# Patient Record
Sex: FEMALE | Race: WHITE | NOT HISPANIC OR LATINO | Employment: OTHER | URBAN - METROPOLITAN AREA
[De-identification: names, ages, dates, MRNs, and addresses within clinical notes are randomized per-mention and may not be internally consistent; named-entity substitution may affect disease eponyms.]

---

## 2019-05-02 ENCOUNTER — OFFICE VISIT (OUTPATIENT)
Dept: FAMILY MEDICINE CLINIC | Facility: CLINIC | Age: 84
End: 2019-05-02

## 2019-05-02 VITALS
BODY MASS INDEX: 20.73 KG/M2 | HEART RATE: 76 BPM | DIASTOLIC BLOOD PRESSURE: 76 MMHG | HEIGHT: 66 IN | WEIGHT: 129 LBS | OXYGEN SATURATION: 96 % | SYSTOLIC BLOOD PRESSURE: 110 MMHG | TEMPERATURE: 98.2 F

## 2019-05-02 DIAGNOSIS — D72.820 LYMPHOCYTOSIS: ICD-10-CM

## 2019-05-02 DIAGNOSIS — E03.9 ACQUIRED HYPOTHYROIDISM: ICD-10-CM

## 2019-05-02 DIAGNOSIS — E11.65 TYPE 2 DIABETES MELLITUS WITH HYPERGLYCEMIA, WITHOUT LONG-TERM CURRENT USE OF INSULIN (HCC): ICD-10-CM

## 2019-05-02 DIAGNOSIS — I10 BENIGN ESSENTIAL HYPERTENSION: Primary | ICD-10-CM

## 2019-05-02 PROBLEM — L40.9 PSORIASIS: Status: ACTIVE | Noted: 2018-03-19

## 2019-05-02 PROCEDURE — 99203 OFFICE O/P NEW LOW 30 MIN: CPT | Performed by: INTERNAL MEDICINE

## 2019-05-02 RX ORDER — LEVOTHYROXINE SODIUM 0.05 MG/1
50 TABLET ORAL DAILY
COMMUNITY
Start: 2019-03-27 | End: 2019-09-09 | Stop reason: SDUPTHER

## 2019-05-02 RX ORDER — IRBESARTAN 150 MG/1
15 TABLET ORAL DAILY
COMMUNITY
Start: 2018-09-17 | End: 2019-09-09 | Stop reason: SDUPTHER

## 2019-05-02 NOTE — PROGRESS NOTES
Subjective Marla is here today to establish care.  Marla Muller is a 86 y.o. female.     History of Present Illness   Marla is here today to establish care for her diabetes cholesterol and thyroid.  She also has some high blood pressure.  She is on Erbe Joce.  She does not have a recall product.  I did review laboratory from her previous physician.  Her TSH was slightly elevated at 4.99.  She has been on the same dose of levothyroxine at 50 mcg for years.  She also has had a steadily escalating white blood cell count with a lymphocyte predominance.  She has not been diagnosed with CLL.  Her  has this.  Also has some non-insulin-dependent diabetes which she controls with diet.  Her last blood sugar was 145.  I cannot find a hemoglobin A1c and her lab work.  The following portions of the patient's history were reviewed and updated as appropriate: allergies, current medications, past family history, past medical history, past social history, past surgical history and problem list.    Review of Systems   Constitutional: Negative for chills, diaphoresis and fever.   HENT: Negative for nosebleeds.    Respiratory: Negative for chest tightness and shortness of breath.    Cardiovascular: Negative for chest pain and leg swelling.   Gastrointestinal: Negative for blood in stool.   Skin: Negative for bruise.   Hematological: Does not bruise/bleed easily.       Objective   Physical Exam   Constitutional: She appears well-developed and well-nourished.   Neck: Carotid bruit is not present. No thyromegaly present.   Cardiovascular: Normal rate, regular rhythm, normal heart sounds and intact distal pulses. Exam reveals no friction rub.   No murmur heard.  Pulmonary/Chest: Effort normal and breath sounds normal. No respiratory distress. She has no wheezes.   Musculoskeletal: She exhibits no edema.         Assessment/Plan   Marla was seen today for establish care, diabetes, hyperlipidemia and hypothyroidism.    Diagnoses and  all orders for this visit:    Benign essential hypertension    Acquired hypothyroidism    Type 2 diabetes mellitus with hyperglycemia, without long-term current use of insulin (CMS/[facility], located [facility])    Lymphocytosis  -     Ambulatory Referral to Hematology / Oncology      Marla is here today to establish care.  I believe she has CLL.  I am going to refer her to an oncologist though I do not think this will need any treatment at the present time.  I am going to recheck her thyroid when she comes back in 3 months.  For now we are not going to change her dose.

## 2019-08-02 ENCOUNTER — OFFICE VISIT (OUTPATIENT)
Dept: FAMILY MEDICINE CLINIC | Facility: CLINIC | Age: 84
End: 2019-08-02

## 2019-08-02 VITALS
WEIGHT: 131.8 LBS | HEIGHT: 66 IN | HEART RATE: 67 BPM | OXYGEN SATURATION: 97 % | DIASTOLIC BLOOD PRESSURE: 80 MMHG | SYSTOLIC BLOOD PRESSURE: 120 MMHG | TEMPERATURE: 98.1 F | BODY MASS INDEX: 21.18 KG/M2

## 2019-08-02 DIAGNOSIS — I10 BENIGN ESSENTIAL HYPERTENSION: ICD-10-CM

## 2019-08-02 DIAGNOSIS — E11.65 TYPE 2 DIABETES MELLITUS WITH HYPERGLYCEMIA, WITHOUT LONG-TERM CURRENT USE OF INSULIN (HCC): Primary | ICD-10-CM

## 2019-08-02 DIAGNOSIS — E03.9 ACQUIRED HYPOTHYROIDISM: ICD-10-CM

## 2019-08-02 PROCEDURE — 99213 OFFICE O/P EST LOW 20 MIN: CPT | Performed by: INTERNAL MEDICINE

## 2019-08-02 NOTE — PROGRESS NOTES
Subjective Chief complaints follow-up for blood pressure but also checkup on diabetes  Marla Muller is a 86 y.o. female.     History of Present Illness   Marla is here today for follow-up.  She seems to be doing well.  She is a little bit fatigued from caring for her .  Does have hypertension fairly well controlled with irbesartan.  She is on very low-dose levothyroxine and is in need of thyroid testing.  Her last hemoglobin A1c for her diet-controlled diabetes was 7.  As needed prescription on new test strips.  Her last visit we did refer her to hematologist for her leukocytosis with lymphocytic predominance.  She still has not been able to see the specialist.  She has an appointment on Monday.  The following portions of the patient's history were reviewed and updated as appropriate: allergies, current medications, past family history, past medical history, past social history, past surgical history and problem list.    Review of Systems   Respiratory: Negative for chest tightness and shortness of breath.    Cardiovascular: Negative for chest pain.       Objective   Physical Exam   Constitutional: She appears well-developed and well-nourished.   Cardiovascular: Normal rate, regular rhythm and normal heart sounds.   Pulmonary/Chest: Effort normal and breath sounds normal.   Musculoskeletal: She exhibits no edema.   Nursing note and vitals reviewed.        Assessment/Plan   Marla was seen today for hypertension.    Diagnoses and all orders for this visit:    Type 2 diabetes mellitus with hyperglycemia, without long-term current use of insulin (CMS/[facility])  -     Basic Metabolic Panel  -     Hemoglobin A1c    Benign essential hypertension    Acquired hypothyroidism  -     TSH+Free T4  -     T3, Free    Other orders  -     glucose blood test strip; 1 each by Other route Daily. Use as instructed      Marla is here today for follow-up.  Her blood pressure seems to be doing well.  We are going to check on status of  her diabetes and thyroid.  If all is well with her we can see her back in 6 months.

## 2019-08-03 LAB
BUN SERPL-MCNC: 15 MG/DL (ref 8–23)
BUN/CREAT SERPL: 22.4 (ref 7–25)
CALCIUM SERPL-MCNC: 9.9 MG/DL (ref 8.6–10.5)
CHLORIDE SERPL-SCNC: 97 MMOL/L (ref 98–107)
CO2 SERPL-SCNC: 27.9 MMOL/L (ref 22–29)
CREAT SERPL-MCNC: 0.67 MG/DL (ref 0.57–1)
GLUCOSE SERPL-MCNC: 147 MG/DL (ref 65–99)
HBA1C MFR BLD: 6.8 % (ref 4.8–5.6)
POTASSIUM SERPL-SCNC: 4 MMOL/L (ref 3.5–5.2)
SODIUM SERPL-SCNC: 137 MMOL/L (ref 136–145)
T3FREE SERPL-MCNC: 2.7 PG/ML (ref 2–4.4)
T4 FREE SERPL-MCNC: 1.24 NG/DL (ref 0.93–1.7)
TSH SERPL DL<=0.005 MIU/L-ACNC: 3.98 MIU/ML (ref 0.27–4.2)

## 2019-08-05 ENCOUNTER — CONSULT (OUTPATIENT)
Dept: ONCOLOGY | Facility: CLINIC | Age: 84
End: 2019-08-05

## 2019-08-05 ENCOUNTER — LAB (OUTPATIENT)
Dept: LAB | Facility: HOSPITAL | Age: 84
End: 2019-08-05

## 2019-08-05 VITALS
WEIGHT: 131.4 LBS | SYSTOLIC BLOOD PRESSURE: 134 MMHG | RESPIRATION RATE: 16 BRPM | TEMPERATURE: 98.5 F | DIASTOLIC BLOOD PRESSURE: 80 MMHG | HEART RATE: 78 BPM | HEIGHT: 64 IN | OXYGEN SATURATION: 98 % | BODY MASS INDEX: 22.43 KG/M2

## 2019-08-05 DIAGNOSIS — D72.820 LYMPHOCYTOSIS: Primary | ICD-10-CM

## 2019-08-05 DIAGNOSIS — R59.1 LYMPHADENOPATHY: ICD-10-CM

## 2019-08-05 LAB
B2 MICROGLOB SERPL-MCNC: 1.7 MG/L (ref 0.8–2.2)
BASOPHILS # BLD AUTO: 0.07 10*3/MM3 (ref 0–0.2)
BASOPHILS NFR BLD AUTO: 0.4 % (ref 0–1.5)
DEPRECATED RDW RBC AUTO: 40.6 FL (ref 37–54)
EOSINOPHIL # BLD AUTO: 0.17 10*3/MM3 (ref 0–0.4)
EOSINOPHIL NFR BLD AUTO: 1.1 % (ref 0.3–6.2)
ERYTHROCYTE [DISTWIDTH] IN BLOOD BY AUTOMATED COUNT: 12.8 % (ref 12.3–15.4)
HCT VFR BLD AUTO: 37.8 % (ref 34–46.6)
HGB BLD-MCNC: 12.8 G/DL (ref 12–15.9)
IGA1 MFR SER: 120 MG/DL (ref 70–400)
IGG1 SER-MCNC: 704 MG/DL (ref 700–1600)
IGM SERPL-MCNC: 43 MG/DL (ref 40–230)
IMM GRANULOCYTES # BLD AUTO: 0.07 10*3/MM3 (ref 0–0.05)
IMM GRANULOCYTES NFR BLD AUTO: 0.4 % (ref 0–0.5)
LDH SERPL-CCNC: 214 U/L (ref 99–259)
LYMPHOCYTES # BLD AUTO: 8.62 10*3/MM3 (ref 0.7–3.1)
LYMPHOCYTES NFR BLD AUTO: 54.2 % (ref 19.6–45.3)
MCH RBC QN AUTO: 29.5 PG (ref 26.6–33)
MCHC RBC AUTO-ENTMCNC: 33.9 G/DL (ref 31.5–35.7)
MCV RBC AUTO: 87.1 FL (ref 79–97)
MONOCYTES # BLD AUTO: 1.03 10*3/MM3 (ref 0.1–0.9)
MONOCYTES NFR BLD AUTO: 6.5 % (ref 5–12)
NEUTROPHILS # BLD AUTO: 5.95 10*3/MM3 (ref 1.7–7)
NEUTROPHILS NFR BLD AUTO: 37.4 % (ref 42.7–76)
NRBC BLD AUTO-RTO: 0 /100 WBC (ref 0–0.2)
PLATELET # BLD AUTO: 281 10*3/MM3 (ref 140–450)
PMV BLD AUTO: 9.9 FL (ref 6–12)
RBC # BLD AUTO: 4.34 10*6/MM3 (ref 3.77–5.28)
WBC NRBC COR # BLD: 15.91 10*3/MM3 (ref 3.4–10.8)

## 2019-08-05 PROCEDURE — 36415 COLL VENOUS BLD VENIPUNCTURE: CPT

## 2019-08-05 PROCEDURE — 82232 ASSAY OF BETA-2 PROTEIN: CPT | Performed by: INTERNAL MEDICINE

## 2019-08-05 PROCEDURE — 85025 COMPLETE CBC W/AUTO DIFF WBC: CPT

## 2019-08-05 PROCEDURE — 99205 OFFICE O/P NEW HI 60 MIN: CPT | Performed by: INTERNAL MEDICINE

## 2019-08-05 PROCEDURE — 83615 LACTATE (LD) (LDH) ENZYME: CPT | Performed by: INTERNAL MEDICINE

## 2019-08-05 PROCEDURE — 82784 ASSAY IGA/IGD/IGG/IGM EACH: CPT | Performed by: INTERNAL MEDICINE

## 2019-08-05 NOTE — PROGRESS NOTES
Subjective     REASON FOR CONSULTATION:  Provide an opinion on any further workup or treatment on:    Lymphocytosis                       REQUESTING PHYSICIAN: Candelario Lindsey*    RECORDS OBTAINED: Records of the patients history including those obtained from the referring provider were reviewed and summarized in detail.    HISTORY OF PRESENT ILLNESS:    Marla Muller is a 86 y.o. patient who was referred for evaluation of leukocytosis and lymphocytosis.  She was found to have a WBC of 22,100 on 3/7/2019.  Her CBC was repeated on 3/12/2019 and was down to 19,100. She did not have an infection at that time and did not start any new medicine at that time. She was however under stress related to having to relocate to [place].      The patient did not feel any lymph nodes. She is not having abdominal pain.          REVIEW OF SYSTEMS:  Review of Systems   Constitutional: Negative for chills, fever and unexpected weight change.   HENT: Negative for mouth sores, nosebleeds, sore throat and voice change.    Eyes: Negative for visual disturbance.   Respiratory: Negative for cough and shortness of breath.    Cardiovascular: Negative for chest pain and leg swelling.   Gastrointestinal: Negative for abdominal pain, blood in stool, constipation, diarrhea, nausea and vomiting.   Genitourinary: Negative for dysuria, frequency and hematuria.   Musculoskeletal: Positive for joint swelling. Negative for arthralgias and back pain.   Skin: Negative for rash.   Neurological: Negative for dizziness, numbness and headaches.   Hematological: Negative for adenopathy. Bruises/bleeds easily.   Psychiatric/Behavioral: Negative for dysphoric mood. The patient is not nervous/anxious.        Past Medical History:   Diagnosis Date   • Anxiety    • Diabetes mellitus (CMS/HCC)     Type 2   • GERD (gastroesophageal reflux disease)    • Hyperlipidemia    • Hypertension    • Hypothyroidism    • Psoriasis        Past Surgical History:  "  Procedure Laterality Date   • ANTERIOR AND POSTERIOR VAGINAL REPAIR     • APPENDECTOMY     • CHOLECYSTECTOMY     • CYSTOSCOPY  2016    Tension free vaginal taping (TVT)   • HYSTERECTOMY     • OTHER SURGICAL HISTORY  2016    Tension-free vaginal taping   • TONSILLECTOMY         Social History     Socioeconomic History   • Marital status:      Spouse name: Not on file   • Number of children: Not on file   • Years of education: Not on file   • Highest education level: Not on file   Occupational History     Employer: RETIRED   Tobacco Use   • Smoking status: Never Smoker   • Smokeless tobacco: Never Used   Substance and Sexual Activity   • Alcohol use: No     Frequency: Never   • Drug use: No   • Sexual activity: Defer       Cancer-related family history includes Cancer (age of onset: 61) in her brother.    MEDICATIONS:    Current Outpatient Medications:   •  Calcium-Vitamin D 500-125 MG-UNIT tablet, Take 1 tablet by mouth Daily., Disp: , Rfl:   •  irbesartan (AVAPRO) 150 MG tablet, Take 15 mg by mouth Daily., Disp: , Rfl:   •  levothyroxine (SYNTHROID) 50 MCG tablet, Take 50 mcg by mouth Daily., Disp: , Rfl:   •  glucose blood test strip, 1 each by Other route Daily. Use as instructed, Disp: 100 each, Rfl: 3     ALLERGIES:  No Known Allergies     Objective   VITAL SIGNS:  Vitals:    08/05/19 1449   BP: 134/80   Pulse: 78   Resp: 16   Temp: 98.5 °F (36.9 °C)   TempSrc: Oral   SpO2: 98%   Weight: 59.6 kg (131 lb 6.4 oz)   Height: 163 cm (64.17\")  Comment: new ht   PainSc: 0-No pain       Wt Readings from Last 3 Encounters:   08/05/19 59.6 kg (131 lb 6.4 oz)   08/02/19 59.8 kg (131 lb 12.8 oz)   05/02/19 58.5 kg (129 lb)       PHYSICAL EXAMINATION  GENERAL:  The patient appears in fair general condition, not in acute distress.  SKIN: Warm and dry. No skin rashes, ecchymosis or petechiae.  HEAD:  Normocephalic.  EYES:  No Jaundice. No Pallor. Pupils equal. EOMI.  MOUTH: No Ulcers. No Thrush. No Exudates.  NECK:  " Supple with Good ROM. No Thyromegaly. No Masses.  LYMPHATICS:  Right mid cervical LN measuring 8 mm. Right axillary lymph node measuring 1.5 cm. No left axillary or inguinal lymphadenopathy.   CHEST: Normal respiratory effort. Lungs clear to auscultation.   CARDIAC:  Normal S1 & S2. No murmurs. No edema.  ABDOMEN:  Soft. No tenderness. No Hepatomegaly. No Splenomegaly. No masses.  EXTREMITIES:  No clubbing. No joint swelling in the hands. No Calf tenderness.  NEUROLOGICAL:  No Focal neurological deficits.         RESULT REVIEW:   Results from last 7 days   Lab Units 08/05/19  1423   WBC 10*3/mm3 15.91*   NEUTROS ABS 10*3/mm3 5.95   HEMOGLOBIN g/dL 12.8   HEMATOCRIT % 37.8   PLATELETS 10*3/mm3 281     Results from last 7 days   Lab Units 08/02/19  1514   SODIUM mmol/L 137   POTASSIUM mmol/L 4.0   CHLORIDE mmol/L 97*   TOTAL CO2 mmol/L 27.9   BUN mg/dL 15   CREATININE mg/dL 0.67   CALCIUM mg/dL 9.9     I reviewed the peripheral blood smear from today. There is an increase in the lymphocytes. Smudge cells identified. No premature cells or blasts were identified.  RBCs have normal morphology. Platelets are normal.       Assessment/Plan   Lymphocytosis. This was identified in March 2019. The patient continues to have lymphocytosis on her CBC from today but the total count has decreased to 8,600.  Review of the peripheral smear showed normal sized lymphocytes. There are smudge cells identified.  She has enlarged lymph nodes in the right cervical and axillary areas. The findings are concerning for a lymphoproliferative disorder like chronic lymphocytic leukemia (CLL).  I explained the findings were explained to the patient and her son. Her  has CLL that was diagnosed in 2017.    PLAN:    1.  Obtain a peripheral blood flow cytometry.  2.  Obtain LDH, B2M and Immunoglobulin levels.   3.  We will contact the patient with the results.    4.  We will see her in follow up in 8 weeks with a repeat CBC.         Indira CANDELARIA  MD Sandy  08/05/19

## 2019-08-09 LAB — REF LAB TEST METHOD: NORMAL

## 2019-09-09 RX ORDER — LEVOTHYROXINE SODIUM 50 MCG
50 TABLET ORAL DAILY
Qty: 90 TABLET | Refills: 3 | Status: SHIPPED | OUTPATIENT
Start: 2019-09-09 | End: 2020-05-08 | Stop reason: SDUPTHER

## 2019-09-09 RX ORDER — IRBESARTAN 150 MG/1
150 TABLET ORAL DAILY
Qty: 90 TABLET | Refills: 1 | Status: SHIPPED | OUTPATIENT
Start: 2019-09-09 | End: 2020-02-26

## 2019-09-25 ENCOUNTER — APPOINTMENT (OUTPATIENT)
Dept: OTHER | Facility: HOSPITAL | Age: 84
End: 2019-09-25

## 2019-09-25 ENCOUNTER — APPOINTMENT (OUTPATIENT)
Dept: ONCOLOGY | Facility: CLINIC | Age: 84
End: 2019-09-25

## 2019-10-23 ENCOUNTER — APPOINTMENT (OUTPATIENT)
Dept: OTHER | Facility: HOSPITAL | Age: 84
End: 2019-10-23

## 2019-10-23 ENCOUNTER — APPOINTMENT (OUTPATIENT)
Dept: ONCOLOGY | Facility: CLINIC | Age: 84
End: 2019-10-23

## 2020-02-26 ENCOUNTER — TELEPHONE (OUTPATIENT)
Dept: ONCOLOGY | Facility: CLINIC | Age: 85
End: 2020-02-26

## 2020-02-26 RX ORDER — IRBESARTAN 150 MG/1
TABLET ORAL
Qty: 90 TABLET | Refills: 1 | Status: SHIPPED | OUTPATIENT
Start: 2020-02-26 | End: 2020-05-08 | Stop reason: SDUPTHER

## 2020-02-26 NOTE — TELEPHONE ENCOUNTER
----- Message from Indira Delgado MD sent at 2/25/2020  6:36 PM EST -----  Patient had an appointment but she canceled.  Please contact her to schedule an appointment in ~ 1 month + CBC    Thank you

## 2020-04-21 ENCOUNTER — TELEPHONE (OUTPATIENT)
Dept: ONCOLOGY | Facility: CLINIC | Age: 85
End: 2020-04-21

## 2020-04-21 NOTE — TELEPHONE ENCOUNTER
Pt called stating she received reminder in the mail for appt on 3/25/20 for , Wellington.    Pt stating Wellington passed away. Went to Wellington's chart and was Wellington marked . Informed pt all appts have been canceled.    After looking at past appts, [facility] was unable to see appt for Wellington on 3/25/20.    Asked pt her name and date of birth and the appt was for her on 3/25/20.     [facility] disconnected with pt and unable to reach pt when calling back.    Please contact pt at [phone] or  [phone] to reschedule 3/25/20 no show appt.

## 2020-05-08 ENCOUNTER — OFFICE VISIT (OUTPATIENT)
Dept: FAMILY MEDICINE CLINIC | Facility: CLINIC | Age: 85
End: 2020-05-08

## 2020-05-08 VITALS — BODY MASS INDEX: 22.43 KG/M2 | TEMPERATURE: 98.5 F | WEIGHT: 131.39 LBS | HEIGHT: 64 IN

## 2020-05-08 DIAGNOSIS — R30.0 DYSURIA: ICD-10-CM

## 2020-05-08 DIAGNOSIS — F41.9 ANXIETY: ICD-10-CM

## 2020-05-08 DIAGNOSIS — E11.65 TYPE 2 DIABETES MELLITUS WITH HYPERGLYCEMIA, WITHOUT LONG-TERM CURRENT USE OF INSULIN (HCC): Primary | ICD-10-CM

## 2020-05-08 PROCEDURE — 99442 PR PHYS/QHP TELEPHONE EVALUATION 11-20 MIN: CPT | Performed by: INTERNAL MEDICINE

## 2020-05-08 RX ORDER — CEFUROXIME AXETIL 250 MG/1
250 TABLET ORAL 2 TIMES DAILY
Qty: 14 TABLET | Refills: 0 | Status: SHIPPED | OUTPATIENT
Start: 2020-05-08 | End: 2020-08-28

## 2020-05-08 RX ORDER — ESCITALOPRAM OXALATE 5 MG/1
5 TABLET ORAL DAILY
Qty: 30 TABLET | Refills: 2 | Status: SHIPPED | OUTPATIENT
Start: 2020-05-08 | End: 2020-08-28

## 2020-05-08 RX ORDER — IRBESARTAN 150 MG/1
150 TABLET ORAL DAILY
Qty: 90 TABLET | Refills: 1 | Status: SHIPPED | OUTPATIENT
Start: 2020-05-08 | End: 2020-12-09

## 2020-05-08 RX ORDER — LEVOTHYROXINE SODIUM 50 MCG
50 TABLET ORAL DAILY
Qty: 90 TABLET | Refills: 3 | Status: SHIPPED | OUTPATIENT
Start: 2020-05-08 | End: 2021-05-06

## 2020-05-08 NOTE — PROGRESS NOTES
Subjective Complaint is possible urinary tract infection  Marla Muller is a 87 y.o. female. This visit has been rescheduled as a phone visit to comply with patient safety concerns in accordance with CDC recommendations. Total time of discussion was 12 minutes.  12 minutes were spent on the phone with the patient.  3 minutes were spent completing the note.        History of Present Illness Marla is complaining of some moderate burning and discomfort with urination.  It feels like a urinary tract infection she had years ago.  She has been trying some Azo and cranberry juice which has helped somewhat.  She is also experiencing some increased frequency with urination.  She does have diabetes.  She is not on any medication.  She has not been checking her blood sugars since she ran out of test strips.  She seems to be experiencing some prolonged grief and anxiety related to her 's passing away.  He passed away approximately 6 months ago today.  Past history is remarkable for some hypothyroidism and hypertension.  She does need refills on medications.    The following portions of the patient's history were reviewed and updated as appropriate: allergies, current medications, past family history, past medical history, past social history, past surgical history and problem list.    Review of Systems   Constitutional: Negative for chills and fever.   Respiratory: Negative for chest tightness and shortness of breath.    Cardiovascular: Negative for chest pain.   Genitourinary: Positive for dysuria, frequency and urgency. Negative for hematuria.   Musculoskeletal: Negative for back pain.   Psychiatric/Behavioral: Positive for dysphoric mood and sleep disturbance. The patient is nervous/anxious.        Objective   Physical Exam   Constitutional: She is oriented to person, place, and time.   Neurological: She is alert and oriented to person, place, and time.   Psychiatric:   She does get somewhat tearful on the phone          Assessment/Plan   Marla was seen today for med refill.    Diagnoses and all orders for this visit:    Type 2 diabetes mellitus with hyperglycemia, without long-term current use of insulin (CMS/[facility])    Anxiety    Dysuria    Other orders  -     irbesartan (AVAPRO) 150 MG tablet; Take 1 tablet by mouth Daily.  -     SYNTHROID 50 MCG tablet; Take 1 tablet by mouth Daily.  -     glucose blood test strip; 1 each by Other route Daily. Use as instructed DX E11.65  -     cefuroxime (CEFTIN) 250 MG tablet; Take 1 tablet by mouth 2 (Two) Times a Day.  -     escitalopram (LEXAPRO) 5 MG tablet; Take 1 tablet by mouth Daily.    Marla is complaining of symptoms consistent with a urinary tract infection.  I am going to treat her with some cefuroxime.  She seems to be having a prolonged grief reaction with some anxiety and we are going to start her on some low-dose Lexapro at 5 mg daily.  She does not want to be on anything long-term.  We did advise her to at least maybe try staying on it for 3 months.  We have renewed her other medications but sounds like we might need to get her in to check on the status of her diabetes.

## 2020-08-28 ENCOUNTER — OFFICE VISIT (OUTPATIENT)
Dept: FAMILY MEDICINE CLINIC | Facility: CLINIC | Age: 85
End: 2020-08-28

## 2020-08-28 VITALS
HEART RATE: 82 BPM | RESPIRATION RATE: 16 BRPM | SYSTOLIC BLOOD PRESSURE: 138 MMHG | DIASTOLIC BLOOD PRESSURE: 80 MMHG | TEMPERATURE: 97.2 F | HEIGHT: 66 IN | WEIGHT: 123.8 LBS | BODY MASS INDEX: 19.89 KG/M2 | OXYGEN SATURATION: 99 %

## 2020-08-28 DIAGNOSIS — E11.65 TYPE 2 DIABETES MELLITUS WITH HYPERGLYCEMIA, WITHOUT LONG-TERM CURRENT USE OF INSULIN (HCC): ICD-10-CM

## 2020-08-28 DIAGNOSIS — E03.9 ACQUIRED HYPOTHYROIDISM: ICD-10-CM

## 2020-08-28 DIAGNOSIS — I49.3 PVC (PREMATURE VENTRICULAR CONTRACTION): ICD-10-CM

## 2020-08-28 DIAGNOSIS — I10 BENIGN ESSENTIAL HYPERTENSION: Primary | ICD-10-CM

## 2020-08-28 PROCEDURE — 99214 OFFICE O/P EST MOD 30 MIN: CPT | Performed by: INTERNAL MEDICINE

## 2020-08-28 RX ORDER — METOPROLOL SUCCINATE 25 MG/1
25 TABLET, EXTENDED RELEASE ORAL DAILY
Qty: 30 TABLET | Refills: 5 | Status: SHIPPED | OUTPATIENT
Start: 2020-08-28 | End: 2020-09-18 | Stop reason: SDUPTHER

## 2020-08-28 RX ORDER — LANCETS 33 GAUGE
1 EACH MISCELLANEOUS DAILY
Qty: 100 EACH | Refills: 3 | Status: SHIPPED | OUTPATIENT
Start: 2020-08-28 | End: 2020-09-01 | Stop reason: SDUPTHER

## 2020-08-28 NOTE — PROGRESS NOTES
Subjective Chief complaint is checkup on blood pressure but also palpitations  Marla Muller is a 88 y.o. female.     History of Present Illness Marla is here today for checkup on her blood pressure.  She basically has been doing well.  She is still grieving over the death of her  from November.  She has been taking her blood pressure at home little more because she had been feeling a little bit funny.  Her blood pressures were higher than usual.  At night she seems to be having some palpitations and fluttering in her chest.  She does not notice it as much during the daytime.  She is not having chest pain with this.  She may be feeling a little more fatigued than usual.  She does have hypothyroidism.  She is only on 50 mcg daily.  Her last thyroid testing a year ago looked okay.  She has not been checking her blood sugars routinely.  She reports that when she was taking care of her  she was neglecting her own health.  She needs to get some test strips and lancets for that.  She also has been having some leg cramps.  She says she tries to keep her self well-hydrated.    The following portions of the patient's history were reviewed and updated as appropriate: allergies, current medications, past family history, past medical history, past social history, past surgical history and problem list.    Review of Systems   Constitutional: Positive for fatigue.   HENT:        Complaining of some popping and crackling sensation in the ears.   Respiratory: Negative for chest tightness and shortness of breath.    Cardiovascular: Negative for chest pain and leg swelling.   Neurological: Negative for dizziness and headache.       Objective   Physical Exam   Constitutional: She appears well-developed and well-nourished.   HENT:   Panic membranes are normal.  There is not much in the way of congestion in the nose.  Rhinorrhea is clear   Neck: Carotid bruit is not present. No thyromegaly present.   Cardiovascular: Normal  rate, regular rhythm, normal heart sounds and intact distal pulses. Exam reveals no friction rub.   No murmur heard.  The rhythm is regular with occasional ectopics.  At times there is some back-to-back ectopic beats.  I suspect these are PVCs.   Pulmonary/Chest: Effort normal and breath sounds normal. No respiratory distress. She has no wheezes.   Abdominal: Soft. Bowel sounds are normal. She exhibits no distension and no mass. There is no tenderness. There is no guarding.   Musculoskeletal: She exhibits no edema.         Assessment/Plan   Marla was seen today for hypertension.    Diagnoses and all orders for this visit:    Benign essential hypertension  -     Comprehensive Metabolic Panel  -     CBC & Differential  -     Lipid Panel    Acquired hypothyroidism  -     TSH+Free T4    PVC (premature ventricular contraction)  -     Magnesium  -     Holter Monitor - 24 Hour; Future    Type 2 diabetes mellitus with hyperglycemia, without long-term current use of insulin (CMS/[facility])  -     Hemoglobin A1c    Other orders  -     metoprolol succinate XL (TOPROL-XL) 25 MG 24 hr tablet; Take 1 tablet by mouth Daily.  -     glucose blood test strip; 1 each by Other route Daily. Patient has one touch glucometer  -     OneTouch Delica Lancets 33G misc; 1 each Daily.      Marla is here today for follow-up.  We are going to add some metoprolol for her blood pressure.  I got 158/70 to my exam.  Hopefully this will also settle down some of the extra beats.  We are going to obtain a Holter monitor and some lab work.  Going to see her back in 3 weeks.

## 2020-08-29 LAB
ALBUMIN SERPL-MCNC: 4.8 G/DL (ref 3.5–5.2)
ALBUMIN/GLOB SERPL: 2.7 G/DL
ALP SERPL-CCNC: 76 U/L (ref 39–117)
ALT SERPL-CCNC: 13 U/L (ref 1–33)
AST SERPL-CCNC: 17 U/L (ref 1–32)
BASOPHILS # BLD AUTO: ABNORMAL 10*3/UL
BASOPHILS # BLD MANUAL: 0.13 10*3/MM3 (ref 0–0.2)
BASOPHILS NFR BLD MANUAL: 1 % (ref 0–1.5)
BILIRUB SERPL-MCNC: 0.5 MG/DL (ref 0–1.2)
BUN SERPL-MCNC: 15 MG/DL (ref 8–23)
BUN/CREAT SERPL: 22.7 (ref 7–25)
CALCIUM SERPL-MCNC: 9.7 MG/DL (ref 8.6–10.5)
CHLORIDE SERPL-SCNC: 100 MMOL/L (ref 98–107)
CHOLEST SERPL-MCNC: 165 MG/DL (ref 0–200)
CO2 SERPL-SCNC: 25.9 MMOL/L (ref 22–29)
CREAT SERPL-MCNC: 0.66 MG/DL (ref 0.57–1)
DIFFERENTIAL COMMENT: ABNORMAL
EOSINOPHIL # BLD AUTO: ABNORMAL 10*3/UL
EOSINOPHIL # BLD MANUAL: 0.38 10*3/MM3 (ref 0–0.4)
EOSINOPHIL NFR BLD AUTO: ABNORMAL %
EOSINOPHIL NFR BLD MANUAL: 3 % (ref 0.3–6.2)
ERYTHROCYTE [DISTWIDTH] IN BLOOD BY AUTOMATED COUNT: 12.9 % (ref 12.3–15.4)
GLOBULIN SER CALC-MCNC: 1.8 GM/DL
GLUCOSE SERPL-MCNC: 136 MG/DL (ref 65–99)
HBA1C MFR BLD: 6.5 % (ref 4.8–5.6)
HCT VFR BLD AUTO: 38.5 % (ref 34–46.6)
HDLC SERPL-MCNC: 65 MG/DL (ref 40–60)
HGB BLD-MCNC: 13.1 G/DL (ref 12–15.9)
LDLC SERPL CALC-MCNC: 87 MG/DL (ref 0–100)
LYMPHOCYTES # BLD AUTO: ABNORMAL 10*3/UL
LYMPHOCYTES # BLD MANUAL: 5.36 10*3/MM3 (ref 0.7–3.1)
LYMPHOCYTES NFR BLD AUTO: ABNORMAL %
LYMPHOCYTES NFR BLD MANUAL: 42 % (ref 19.6–45.3)
MAGNESIUM SERPL-MCNC: 2.1 MG/DL (ref 1.6–2.4)
MCH RBC QN AUTO: 29.8 PG (ref 26.6–33)
MCHC RBC AUTO-ENTMCNC: 34 G/DL (ref 31.5–35.7)
MCV RBC AUTO: 87.5 FL (ref 79–97)
MONOCYTES # BLD MANUAL: 1.53 10*3/MM3 (ref 0.1–0.9)
MONOCYTES NFR BLD AUTO: ABNORMAL %
MONOCYTES NFR BLD MANUAL: 12 % (ref 5–12)
NEUTROPHILS # BLD MANUAL: 5.36 10*3/MM3 (ref 1.7–7)
NEUTROPHILS NFR BLD AUTO: ABNORMAL %
NEUTROPHILS NFR BLD MANUAL: 42 % (ref 42.7–76)
PLATELET # BLD AUTO: 256 10*3/MM3 (ref 140–450)
PLATELET BLD QL SMEAR: ABNORMAL
POTASSIUM SERPL-SCNC: 4.2 MMOL/L (ref 3.5–5.2)
PROT SERPL-MCNC: 6.6 G/DL (ref 6–8.5)
RBC # BLD AUTO: 4.4 10*6/MM3 (ref 3.77–5.28)
RBC MORPH BLD: ABNORMAL
SODIUM SERPL-SCNC: 135 MMOL/L (ref 136–145)
T4 FREE SERPL-MCNC: 1.25 NG/DL (ref 0.93–1.7)
TRIGL SERPL-MCNC: 67 MG/DL (ref 0–150)
TSH SERPL DL<=0.005 MIU/L-ACNC: 3.9 UIU/ML (ref 0.27–4.2)
VLDLC SERPL CALC-MCNC: 13.4 MG/DL
WBC # BLD AUTO: 12.76 10*3/MM3 (ref 3.4–10.8)

## 2020-09-01 ENCOUNTER — TELEPHONE (OUTPATIENT)
Dept: FAMILY MEDICINE CLINIC | Facility: CLINIC | Age: 85
End: 2020-09-01

## 2020-09-01 DIAGNOSIS — E11.65 TYPE 2 DIABETES MELLITUS WITH HYPERGLYCEMIA, WITHOUT LONG-TERM CURRENT USE OF INSULIN (HCC): Primary | ICD-10-CM

## 2020-09-01 RX ORDER — LANCETS 33 GAUGE
1 EACH MISCELLANEOUS DAILY
Qty: 100 EACH | Refills: 3 | Status: SHIPPED | OUTPATIENT
Start: 2020-09-01 | End: 2020-09-09 | Stop reason: SDUPTHER

## 2020-09-01 NOTE — TELEPHONE ENCOUNTER
PATIENT CALLED FOR MED REQUEST FOR     OneTouch Delica Lancets 33G misc    glucose blood test strip    PHARMACY NEEDS A DIAGNOSES CODE.     [address] ([place]), [place] - [address] [facility] - [phone]  - [phone]   [phone]    PATIENT CALL BACK NUMBER [phone]

## 2020-09-03 ENCOUNTER — HOSPITAL ENCOUNTER (OUTPATIENT)
Dept: CARDIOLOGY | Facility: HOSPITAL | Age: 85
Discharge: HOME | End: 2020-09-03
Admitting: INTERNAL MEDICINE

## 2020-09-03 DIAGNOSIS — I49.3 PVC (PREMATURE VENTRICULAR CONTRACTION): ICD-10-CM

## 2020-09-03 PROCEDURE — 93226 XTRNL ECG REC<48 HR SCAN A/R: CPT

## 2020-09-03 PROCEDURE — 93225 XTRNL ECG REC<48 HRS REC: CPT

## 2020-09-06 PROCEDURE — 93227 XTRNL ECG REC<48 HR R&I: CPT | Performed by: INTERNAL MEDICINE

## 2020-09-09 ENCOUNTER — TELEPHONE (OUTPATIENT)
Dept: FAMILY MEDICINE CLINIC | Facility: CLINIC | Age: 85
End: 2020-09-09

## 2020-09-09 DIAGNOSIS — E11.65 TYPE 2 DIABETES MELLITUS WITH HYPERGLYCEMIA, WITHOUT LONG-TERM CURRENT USE OF INSULIN (HCC): ICD-10-CM

## 2020-09-09 RX ORDER — LANCETS 33 GAUGE
1 EACH MISCELLANEOUS DAILY
Qty: 100 EACH | Refills: 3 | Status: SHIPPED | OUTPATIENT
Start: 2020-09-09

## 2020-09-16 ENCOUNTER — TELEPHONE (OUTPATIENT)
Dept: FAMILY MEDICINE CLINIC | Facility: CLINIC | Age: 85
End: 2020-09-16

## 2020-09-16 NOTE — TELEPHONE ENCOUNTER
[facility] pharmacy is calling to request diagnosis code be added to this RX and resend.    glucose blood test strip    Please advise.    [address] ([place]), [place] - [address] [facility] - [phone]  - [phone]   [phone]

## 2020-09-18 ENCOUNTER — OFFICE VISIT (OUTPATIENT)
Dept: FAMILY MEDICINE CLINIC | Facility: CLINIC | Age: 85
End: 2020-09-18

## 2020-09-18 ENCOUNTER — TELEPHONE (OUTPATIENT)
Dept: FAMILY MEDICINE CLINIC | Facility: CLINIC | Age: 85
End: 2020-09-18

## 2020-09-18 VITALS
SYSTOLIC BLOOD PRESSURE: 166 MMHG | RESPIRATION RATE: 16 BRPM | HEIGHT: 66 IN | HEART RATE: 81 BPM | TEMPERATURE: 96.9 F | BODY MASS INDEX: 20.76 KG/M2 | DIASTOLIC BLOOD PRESSURE: 80 MMHG | OXYGEN SATURATION: 98 % | WEIGHT: 129.2 LBS

## 2020-09-18 DIAGNOSIS — I10 BENIGN ESSENTIAL HYPERTENSION: Primary | ICD-10-CM

## 2020-09-18 DIAGNOSIS — I49.1 PAC (PREMATURE ATRIAL CONTRACTION): ICD-10-CM

## 2020-09-18 DIAGNOSIS — F43.21 GRIEF: ICD-10-CM

## 2020-09-18 PROCEDURE — 99214 OFFICE O/P EST MOD 30 MIN: CPT | Performed by: INTERNAL MEDICINE

## 2020-09-18 RX ORDER — ESCITALOPRAM OXALATE 5 MG/1
5 TABLET ORAL DAILY
COMMUNITY
End: 2020-10-23

## 2020-09-18 RX ORDER — METOPROLOL SUCCINATE 50 MG/1
50 TABLET, EXTENDED RELEASE ORAL DAILY
Qty: 30 TABLET | Refills: 5 | Status: SHIPPED | OUTPATIENT
Start: 2020-09-18 | End: 2020-11-18 | Stop reason: SDUPTHER

## 2020-09-18 NOTE — PROGRESS NOTES
Subjective Chief complaint is follow-up on blood pressure and palpitations  Marla Muller is a 88 y.o. female.     History of Present Illness voice is here today for follow-up.  At her last visit we did hear some ectopics.  These turned out to be mostly PACs.  She is averaging approximately 9/min.  There is also some degree of a sinus arrhythmia.  There was no atrial fibrillation.  Her blood pressure still little bit elevated but she reports that she has had a bad week.  She reports that the metoprolol may have settled down some of the palpitations but not all of them.  She did not start taking the generic Lexapro.  She wanted to talk about that again.    The following portions of the patient's history were reviewed and updated as appropriate: allergies, current medications, past family history, past medical history, past social history, past surgical history and problem list.    Review of Systems   Constitutional: Negative for chills and fever.   Respiratory: Negative for cough, chest tightness and shortness of breath.    Cardiovascular: Positive for palpitations. Negative for chest pain and leg swelling.   Psychiatric/Behavioral: Positive for sleep disturbance.       Objective   Physical Exam  Vitals signs and nursing note reviewed.   Neck:      Vascular: No carotid bruit.   Cardiovascular:      Rate and Rhythm: Normal rate. Rhythm irregular.      Heart sounds: No murmur.      Comments: She still is having at least 3 or 4 PACs per minute.  Pulmonary:      Effort: Pulmonary effort is normal.      Breath sounds: No wheezing or rales.   Neurological:      Mental Status: She is alert.           Assessment/Plan   Marla was seen today for hypertension.    Diagnoses and all orders for this visit:    Benign essential hypertension    PAC (premature atrial contraction)    Grief    Other orders  -     metoprolol succinate XL (TOPROL-XL) 50 MG 24 hr tablet; Take 1 tablet by mouth Daily.    Marla is here today for follow-up.   Her blood pressure is little higher actually than it was last time.  She does report that this is been a somewhat stressful week.  I am going to increase her metoprolol to 50 mg daily.  We will see her back in 1 month.  I did encourage her to go ahead and start the very low dose generic Lexapro.

## 2020-10-12 ENCOUNTER — OFFICE VISIT (OUTPATIENT)
Dept: FAMILY MEDICINE CLINIC | Facility: CLINIC | Age: 85
End: 2020-10-12

## 2020-10-12 ENCOUNTER — TELEPHONE (OUTPATIENT)
Dept: FAMILY MEDICINE CLINIC | Facility: CLINIC | Age: 85
End: 2020-10-12

## 2020-10-12 DIAGNOSIS — N30.90 CYSTITIS: Primary | ICD-10-CM

## 2020-10-12 PROCEDURE — 99441 PR PHYS/QHP TELEPHONE EVALUATION 5-10 MIN: CPT | Performed by: INTERNAL MEDICINE

## 2020-10-12 RX ORDER — SULFAMETHOXAZOLE AND TRIMETHOPRIM 400; 80 MG/1; MG/1
1 TABLET ORAL 2 TIMES DAILY
Qty: 10 TABLET | Refills: 0 | Status: SHIPPED | OUTPATIENT
Start: 2020-10-12 | End: 2020-10-23

## 2020-10-12 NOTE — PROGRESS NOTES
Subjective urinary frequency and pain  Marla Muller is a 88 y.o. female. This visit has been rescheduled as a phone visit to comply with patient safety concerns in accordance with CDC recommendations. Total time of discussion was 8 minutes.        History of Present Illness Marla is complaining of some urinary frequency and burning.  This began approximately 4 days ago.  Initially she drank some cranberry juice and this seemed to make it better but the symptoms returned on the 10th of this month.  She is not having fever or chills.  She is not having any blood in the urine.  She has no prior history of kidney stones.  She last remembers having a urinary tract infection years and years ago.  She does report this feels like that.  Reports that her blood pressure seem to be doing well and her heart rate seems to be doing well on the metoprolol.    The following portions of the patient's history were reviewed and updated as appropriate: allergies, current medications, past family history, past medical history, past social history, past surgical history and problem list.    Review of Systems   Constitutional: Negative for chills and fever.   Genitourinary: Positive for dysuria and urgency. Negative for flank pain, hematuria and pelvic pain.       Objective   Physical Exam  Pulmonary:      Comments: Respirations sound unlabored on the phone  Neurological:      Comments: Speech sounds normal           Assessment/Plan   Marla was seen today for urinary frequency.    Diagnoses and all orders for this visit:    Cystitis    Other orders  -     sulfamethoxazole-trimethoprim (Bactrim) 400-80 MG tablet; Take 1 tablet by mouth 2 (Two) Times a Day.

## 2020-10-12 NOTE — TELEPHONE ENCOUNTER
Patient called in stating she has a uti, she's experiencing burning when she urinates and frequent urination.   Patient said she's unable to come to the office because she doesn't have a ride.   The next Telehealth visit is on Wednesday and the patient said she can't wait until then.    Please call back to advise.    Yessenia [address]    Best call back # [phone]

## 2020-10-23 ENCOUNTER — OFFICE VISIT (OUTPATIENT)
Dept: FAMILY MEDICINE CLINIC | Facility: CLINIC | Age: 85
End: 2020-10-23

## 2020-10-23 VITALS
HEIGHT: 68 IN | HEART RATE: 82 BPM | TEMPERATURE: 97.7 F | WEIGHT: 129.8 LBS | SYSTOLIC BLOOD PRESSURE: 126 MMHG | DIASTOLIC BLOOD PRESSURE: 80 MMHG | OXYGEN SATURATION: 99 % | BODY MASS INDEX: 19.67 KG/M2

## 2020-10-23 DIAGNOSIS — F41.9 ANXIETY: ICD-10-CM

## 2020-10-23 DIAGNOSIS — E11.65 TYPE 2 DIABETES MELLITUS WITH HYPERGLYCEMIA, WITHOUT LONG-TERM CURRENT USE OF INSULIN (HCC): Primary | ICD-10-CM

## 2020-10-23 DIAGNOSIS — I10 BENIGN ESSENTIAL HYPERTENSION: ICD-10-CM

## 2020-10-23 PROCEDURE — 99213 OFFICE O/P EST LOW 20 MIN: CPT | Performed by: INTERNAL MEDICINE

## 2020-10-23 NOTE — PROGRESS NOTES
Subjective Chief complaint is checkup after urinary tract infection  Marla Muller is a 88 y.o. female.     History of Present Illness Marla is here today for checkup after having a urinary tract infection.  We took care of that over the phone.  The Septra seems to have taken away her symptoms.  She wonders why she may be getting these.  I did advise that some of this is just pelvic floor problems.  Also at her last visit we did try her on some generic Lexapro for some prolonged grief and anxiety.  She did not like the way it made her feel and she has stopped it.  She still seems to have some palpitations but these occur mostly only at night.    The following portions of the patient's history were reviewed and updated as appropriate: allergies, current medications, past family history, past medical history, past social history, past surgical history and problem list.    Review of Systems   Neurological: Positive for light-headedness.       Objective   Physical Exam  Constitutional:       Appearance: Normal appearance.   Cardiovascular:      Rate and Rhythm: Normal rate and regular rhythm.      Pulses: Normal pulses.   Pulmonary:      Effort: Pulmonary effort is normal.      Breath sounds: No wheezing or rales.   Abdominal:      General: Bowel sounds are normal.      Palpations: Abdomen is soft.      Tenderness: There is no abdominal tenderness.   Musculoskeletal:      Right lower leg: No edema.      Left lower leg: No edema.   Neurological:      General: No focal deficit present.      Mental Status: She is alert and oriented to person, place, and time.           Assessment/Plan   Diagnoses and all orders for this visit:    1. Type 2 diabetes mellitus with hyperglycemia, without long-term current use of insulin (CMS/[facility]) (Primary)    2. Benign essential hypertension    3. Anxiety    Marla is here today for follow-up.  Her diabetes seems to be controlled without any medications.  Her blood pressure to my exam was  140/60.  I am not going to make any changes in her medication.  I will see her back in 6 months.

## 2020-11-18 RX ORDER — METOPROLOL SUCCINATE 50 MG/1
50 TABLET, EXTENDED RELEASE ORAL DAILY
Qty: 30 TABLET | Refills: 5 | Status: SHIPPED | OUTPATIENT
Start: 2020-11-18 | End: 2020-11-18

## 2020-11-18 RX ORDER — METOPROLOL SUCCINATE 50 MG/1
50 TABLET, EXTENDED RELEASE ORAL DAILY
Qty: 90 TABLET | Refills: 1 | Status: SHIPPED | OUTPATIENT
Start: 2020-11-18 | End: 2021-05-07 | Stop reason: SDUPTHER

## 2020-11-18 NOTE — TELEPHONE ENCOUNTER
Caller: Marla Muller    Relationship: Self    Best call back number:  [phone]    Medication needed:   Requested Prescriptions     Pending Prescriptions Disp Refills   • metoprolol succinate XL (TOPROL-XL) 50 MG 24 hr tablet 30 tablet 5     Sig: Take 1 tablet by mouth Daily.       When do you need the refill by:     What details did the patient provide when requesting the medication: PT IS REQUESTING A 90 DAY SUPPLY ON THIS    Does the patient have less than a 3 day supply:  [] Yes  [x] No    What is the patient's preferred pharmacy:      [facility]  [address]  [phone]

## 2020-12-08 NOTE — TELEPHONE ENCOUNTER
Patient called to check on status of refills of irbesartan (AVAPRO) 150 MG tablet    [facility] - [place], [place] - [address] AT Portal to [facility] - [phone]  - [phone] FX

## 2020-12-09 RX ORDER — IRBESARTAN 150 MG/1
TABLET ORAL
Qty: 90 TABLET | Refills: 1 | Status: SHIPPED | OUTPATIENT
Start: 2020-12-09 | End: 2021-05-06

## 2020-12-09 NOTE — TELEPHONE ENCOUNTER
Patient is calling on this refill. Has been trying to get refilled since 11/20/20    [facility] - [place], [place] - [address] AT Portal to [facility] - [phone]  - [phone] FX       Please advise  [phone]

## 2021-02-23 ENCOUNTER — OFFICE VISIT (OUTPATIENT)
Dept: FAMILY MEDICINE CLINIC | Facility: CLINIC | Age: 86
End: 2021-02-23

## 2021-02-23 VITALS — DIASTOLIC BLOOD PRESSURE: 70 MMHG | SYSTOLIC BLOOD PRESSURE: 160 MMHG

## 2021-02-23 DIAGNOSIS — N39.0 RECURRENT UTI: Primary | ICD-10-CM

## 2021-02-23 PROCEDURE — 99442 PR PHYS/QHP TELEPHONE EVALUATION 11-20 MIN: CPT | Performed by: FAMILY MEDICINE

## 2021-02-23 RX ORDER — SULFAMETHOXAZOLE AND TRIMETHOPRIM 800; 160 MG/1; MG/1
1 TABLET ORAL 2 TIMES DAILY
Qty: 10 TABLET | Refills: 0 | Status: SHIPPED | OUTPATIENT
Start: 2021-02-23 | End: 2021-05-14

## 2021-02-23 NOTE — PROGRESS NOTES
HPI  Marla Muller is a 88 y.o. female telephone visit for recurrence of burning on urination.  Reports recurrent bladder infections treated by Dr. Loya.  Most recent antibiotic was very effective, apparently received Bactrim last October.  Discussed ideally would get urine sample to send for culture but apparently not the current option.  Denies fever or chills.  Only actual complaint is burning on urination.  Reviewing most recent lab work reveals normal renal function.  Patient denies known allergies.      Review of Systems   Constitutional: Negative for chills, diaphoresis and fever.   Genitourinary: Positive for dysuria.         Past Medical History:   Diagnosis Date   • Anxiety    • Diabetes mellitus (CMS/HCC)     Type 2   • GERD (gastroesophageal reflux disease)    • Hyperlipidemia    • Hypertension    • Hypothyroidism    • Psoriasis        Past Surgical History:   Procedure Laterality Date   • ANTERIOR AND POSTERIOR VAGINAL REPAIR     • APPENDECTOMY     • CHOLECYSTECTOMY  2010   • CYSTOSCOPY  2016    Tension free vaginal taping (TVT)   • HYSTERECTOMY     • OTHER SURGICAL HISTORY  2016    Tension-free vaginal taping   • TONSILLECTOMY         Family History   Problem Relation Age of Onset   • Diabetes Mother    • Heart disease Mother    • Hypertension Mother    • Thyroid disease Mother    • Depression Father    • Mental illness Father    • Arthritis Sister    • Depression Sister    • Diabetes Sister    • Hypertension Sister    • Mental illness Sister    • Thyroid disease Sister    • Heart disease Sister    • Heart disease Brother    • Cancer Brother 61        throat   • Diabetes Brother        Social History     Socioeconomic History   • Marital status:      Spouse name: Not on file   • Number of children: Not on file   • Years of education: Some college   • Highest education level: Not on file   Occupational History     Employer: RETIRED   Tobacco Use   • Smoking status: Never Smoker   • Smokeless  tobacco: Never Used   Substance and Sexual Activity   • Alcohol use: No     Frequency: Never   • Drug use: No   • Sexual activity: Defer       Vitals:    02/23/21 1421   BP: 160/70        There is no height or weight on file to calculate BMI.      Physical Exam  Constitutional:       General: She is not in acute distress.  Pulmonary:      Effort: Pulmonary effort is normal.   Neurological:      Mental Status: She is alert and oriented to person, place, and time.   Psychiatric:         Mood and Affect: Mood normal.         Thought Content: Thought content normal.         Judgment: Judgment normal.     Telephone visit for burning on urination.  Has history of recurrent urinary tract infections.  Discussed ideally would get urine sample and send for culture.  Will send empiric antibiotic therapy as discussed.  Call office if symptoms worsen or persist.    This note includes information entered using a voice recognition dictation system.  Though reviewed, some nonsensible errors may remain.  .          Assessment/Plan    Diagnoses and all orders for this visit:    1. Recurrent UTI (Primary)  -     sulfamethoxazole-trimethoprim (Bactrim DS) 800-160 MG per tablet; Take 1 tablet by mouth 2 (Two) Times a Day.  Dispense: 10 tablet; Refill: 0        This visit has been rescheduled as a phone visit to comply with patient safety concerns in accordance with CDC recommendations. Total time of discussion was 12 minutes.

## 2021-03-02 ENCOUNTER — TELEPHONE (OUTPATIENT)
Dept: FAMILY MEDICINE CLINIC | Facility: CLINIC | Age: 86
End: 2021-03-02

## 2021-03-02 RX ORDER — CEFUROXIME AXETIL 250 MG/1
250 TABLET ORAL 2 TIMES DAILY
Qty: 10 TABLET | Refills: 0 | Status: SHIPPED | OUTPATIENT
Start: 2021-03-02 | End: 2021-05-14

## 2021-03-02 NOTE — TELEPHONE ENCOUNTER
Caller: Marla Muller    Relationship: Self    Best call back number: [phone] (M)    What medication are you requesting: PATIENT WOULD LIKE TO KNOW IF DR MARTINEZ HAS ANY OTHER ALTERNATIVES    What are your current symptoms: BURNING SENSATION WHEN URINATING     How long have you been experiencing symptoms: 2 WEEKS    Have you had these symptoms before:    [x] Yes  [] No    Have you been treated for these symptoms before:   [x] Yes  [] No    If a prescription is needed, what is your preferred pharmacy and phone number:  [address] ([place]), [place] - [address] [facility] - [phone] [facility] [phone]   [phone]    Additional notes: PATIENT STATES COMPLETED ANTIBIOTICS ON Sunday AND CONTINUES TO HAVE BURNING SENSATION    I advised the patient that ideally I would like to see a urine culture.  However that may not be feasible given the patient's transportation situation.  She does report that she got initial relief from the antibiotic but now it seems to have recurred just a few days after finishing.  I am going to try different antibiotic but if that does not work then she will need to leave a specimen.

## 2021-05-06 RX ORDER — IRBESARTAN 150 MG/1
TABLET ORAL
Qty: 90 TABLET | Refills: 1 | Status: SHIPPED | OUTPATIENT
Start: 2021-05-06 | End: 2021-11-22

## 2021-05-06 RX ORDER — LEVOTHYROXINE SODIUM 50 MCG
TABLET ORAL
Qty: 90 TABLET | Refills: 3 | Status: SHIPPED | OUTPATIENT
Start: 2021-05-06 | End: 2022-02-03

## 2021-05-07 DIAGNOSIS — I10 BENIGN ESSENTIAL HYPERTENSION: Primary | ICD-10-CM

## 2021-05-07 RX ORDER — METOPROLOL SUCCINATE 50 MG/1
50 TABLET, EXTENDED RELEASE ORAL DAILY
Qty: 90 TABLET | Refills: 0 | Status: SHIPPED | OUTPATIENT
Start: 2021-05-07 | End: 2021-08-12 | Stop reason: SDUPTHER

## 2021-05-14 ENCOUNTER — OFFICE VISIT (OUTPATIENT)
Dept: FAMILY MEDICINE CLINIC | Facility: CLINIC | Age: 86
End: 2021-05-14

## 2021-05-14 VITALS
SYSTOLIC BLOOD PRESSURE: 138 MMHG | TEMPERATURE: 96 F | BODY MASS INDEX: 21.69 KG/M2 | HEIGHT: 66 IN | RESPIRATION RATE: 20 BRPM | OXYGEN SATURATION: 95 % | WEIGHT: 135 LBS | DIASTOLIC BLOOD PRESSURE: 78 MMHG | HEART RATE: 65 BPM

## 2021-05-14 DIAGNOSIS — K21.9 GASTROESOPHAGEAL REFLUX DISEASE, UNSPECIFIED WHETHER ESOPHAGITIS PRESENT: ICD-10-CM

## 2021-05-14 DIAGNOSIS — E03.9 ACQUIRED HYPOTHYROIDISM: ICD-10-CM

## 2021-05-14 DIAGNOSIS — E11.65 TYPE 2 DIABETES MELLITUS WITH HYPERGLYCEMIA, WITHOUT LONG-TERM CURRENT USE OF INSULIN (HCC): ICD-10-CM

## 2021-05-14 DIAGNOSIS — I10 BENIGN ESSENTIAL HYPERTENSION: Primary | ICD-10-CM

## 2021-05-14 PROCEDURE — 99213 OFFICE O/P EST LOW 20 MIN: CPT | Performed by: INTERNAL MEDICINE

## 2021-05-15 LAB
ALBUMIN SERPL-MCNC: 4.7 G/DL (ref 3.5–5.2)
ALBUMIN/GLOB SERPL: 2.1 G/DL
ALP SERPL-CCNC: 72 U/L (ref 39–117)
ALT SERPL-CCNC: 15 U/L (ref 1–33)
AST SERPL-CCNC: 22 U/L (ref 1–32)
BILIRUB SERPL-MCNC: 0.6 MG/DL (ref 0–1.2)
BUN SERPL-MCNC: 12 MG/DL (ref 8–23)
BUN/CREAT SERPL: 20.3 (ref 7–25)
CALCIUM SERPL-MCNC: 10 MG/DL (ref 8.6–10.5)
CHLORIDE SERPL-SCNC: 100 MMOL/L (ref 98–107)
CO2 SERPL-SCNC: 27.2 MMOL/L (ref 22–29)
CREAT SERPL-MCNC: 0.59 MG/DL (ref 0.57–1)
GLOBULIN SER CALC-MCNC: 2.2 GM/DL
GLUCOSE SERPL-MCNC: 137 MG/DL (ref 65–99)
HBA1C MFR BLD: 6.6 % (ref 4.8–5.6)
POTASSIUM SERPL-SCNC: 3.9 MMOL/L (ref 3.5–5.2)
PROT SERPL-MCNC: 6.9 G/DL (ref 6–8.5)
SODIUM SERPL-SCNC: 137 MMOL/L (ref 136–145)
T3FREE SERPL-MCNC: 2.7 PG/ML (ref 2–4.4)
T4 FREE SERPL-MCNC: 1.3 NG/DL (ref 0.93–1.7)
TSH SERPL DL<=0.005 MIU/L-ACNC: 5.65 UIU/ML (ref 0.27–4.2)

## 2021-06-28 ENCOUNTER — OFFICE VISIT (OUTPATIENT)
Dept: FAMILY MEDICINE CLINIC | Facility: CLINIC | Age: 86
End: 2021-06-28

## 2021-06-28 VITALS
RESPIRATION RATE: 16 BRPM | DIASTOLIC BLOOD PRESSURE: 88 MMHG | SYSTOLIC BLOOD PRESSURE: 120 MMHG | HEART RATE: 60 BPM | BODY MASS INDEX: 21.38 KG/M2 | HEIGHT: 66 IN | WEIGHT: 133 LBS

## 2021-06-28 DIAGNOSIS — R30.0 DYSURIA: Primary | ICD-10-CM

## 2021-06-28 LAB
BILIRUB BLD-MCNC: NEGATIVE MG/DL
CLARITY, POC: ABNORMAL
COLOR UR: YELLOW
GLUCOSE UR STRIP-MCNC: NEGATIVE MG/DL
KETONES UR QL: NEGATIVE
LEUKOCYTE EST, POC: ABNORMAL
NITRITE UR-MCNC: NEGATIVE MG/ML
PH UR: 7 [PH] (ref 5–8)
PROT UR STRIP-MCNC: ABNORMAL MG/DL
RBC # UR STRIP: ABNORMAL /UL
SP GR UR: 1.02 (ref 1–1.03)
UROBILINOGEN UR QL: NORMAL

## 2021-06-28 PROCEDURE — 81003 URINALYSIS AUTO W/O SCOPE: CPT | Performed by: INTERNAL MEDICINE

## 2021-06-28 PROCEDURE — 99214 OFFICE O/P EST MOD 30 MIN: CPT | Performed by: INTERNAL MEDICINE

## 2021-06-28 RX ORDER — SULFAMETHOXAZOLE AND TRIMETHOPRIM 800; 160 MG/1; MG/1
1 TABLET ORAL 2 TIMES DAILY
Qty: 10 TABLET | Refills: 0 | Status: SHIPPED | OUTPATIENT
Start: 2021-06-28 | End: 2021-07-02 | Stop reason: SDUPTHER

## 2021-06-28 NOTE — PROGRESS NOTES
06/28/2021    CC: Difficulty Urinating (pain and burning with urination.  x3 wks)  .        HPI  Difficulty Urinating  The current episode started 1 to 4 weeks ago. The problem occurs 2 to 4 times per day. The problem has been unchanged. Nothing aggravates the symptoms. She has tried nothing for the symptoms.        Subjective   Marla Muller is a 88 y.o. female.      The following portions of the patient's history were reviewed and updated as appropriate: allergies, current medications, past family history, past medical history, past social history, past surgical history and problem list.    Problem List  Patient Active Problem List   Diagnosis   • Anxiety   • Benign essential hypertension   • Esophageal reflux   • Hypothyroidism   • Prolapsed urethral mucosa   • Psoriasis   • Type 2 diabetes mellitus with hyperglycemia (CMS/HCC)   • Urinary incontinence, mixed       Past Medical History  Past Medical History:   Diagnosis Date   • Anxiety    • Diabetes mellitus (CMS/HCC)     Type 2   • GERD (gastroesophageal reflux disease)    • Hyperlipidemia    • Hypertension    • Hypothyroidism    • Psoriasis        Surgical History  Past Surgical History:   Procedure Laterality Date   • ANTERIOR AND POSTERIOR VAGINAL REPAIR     • APPENDECTOMY     • CHOLECYSTECTOMY  2010   • CYSTOSCOPY  2016    Tension free vaginal taping (TVT)   • HYSTERECTOMY     • OTHER SURGICAL HISTORY  2016    Tension-free vaginal taping   • TONSILLECTOMY         Family History  Family History   Problem Relation Age of Onset   • Diabetes Mother    • Heart disease Mother    • Hypertension Mother    • Thyroid disease Mother    • Depression Father    • Mental illness Father    • Arthritis Sister    • Depression Sister    • Diabetes Sister    • Hypertension Sister    • Mental illness Sister    • Thyroid disease Sister    • Heart disease Sister    • Heart disease Brother    • Cancer Brother 61        throat   • Diabetes Brother        Social History  Social  History    Tobacco Use      Smoking status: Never Smoker      Smokeless tobacco: Never Used       Is the Patient a current tobacco user? No    Allergies  No Known Allergies    Current Medications    Current Outpatient Medications:   •  glucose blood test strip, 1 each by Other route Daily. Patient has one touch glucometer, Disp: 100 each, Rfl: 3  •  irbesartan (AVAPRO) 150 MG tablet, TAKE 1 TABLET DAILY, Disp: 90 tablet, Rfl: 1  •  metoprolol succinate XL (TOPROL-XL) 50 MG 24 hr tablet, Take 1 tablet by mouth Daily., Disp: 90 tablet, Rfl: 0  •  OneTouch Delica Lancets 33G misc, 1 each Daily., Disp: 100 each, Rfl: 3  •  Synthroid 50 MCG tablet, TAKE 1 TABLET DAILY, Disp: 90 tablet, Rfl: 3  •  sulfamethoxazole-trimethoprim (BACTRIM DS,SEPTRA DS) 800-160 MG per tablet, Take 1 tablet by mouth 2 (Two) Times a Day., Disp: 10 tablet, Rfl: 0     Review of System  Review of Systems   Constitutional: Negative.    Eyes: Negative.    Respiratory: Negative.    Cardiovascular: Negative.    Gastrointestinal: Negative.    Genitourinary: Positive for difficulty urinating.     I have reviewed and confirmed the accuracy of the ROS as documented by the MA/LPN/RN Dileep Curtis MD    Vitals:    06/28/21 1608   BP: 120/88   Pulse: 60   Resp: 16     Body mass index is 21.47 kg/m².    Objective     Physical Exam  Physical Exam  Constitutional:       Appearance: Normal appearance.   HENT:      Head: Normocephalic.   Cardiovascular:      Rate and Rhythm: Normal rate and regular rhythm.   Neurological:      Mental Status: She is alert.         Assessment/Plan      This pleasant 88-year-old who is normally seen by Clarissa presents today with a complaint of difficulty urinating x3 weeks.  She relates she is drinking up to 8 glasses of water today but this is not helped with her difficulty.  She denies any chills or fever.  She relates that she has had bladder infections in the past but does not recall any medication she is taking.    Urine  dip shows glucose negative, bilirubin negative, ketones negative.  Specific gravity 1.020 blood moderate, pH 7.0, nitrites negative and leukocytes large.    I feel the patient had presumptive urinary tract infection will treat with Bactrim DS 1 tab p.o. every 12.  We will send her urine off for culture and sensitivity.        Diagnoses and all orders for this visit:    1. Dysuria (Primary)  -     POCT urinalysis dipstick, automated  -     Urine Culture - Urine, Urine, Clean Catch; Future  -     Urine Culture - Urine, Urine, Clean Catch    Other orders  -     sulfamethoxazole-trimethoprim (BACTRIM DS,SEPTRA DS) 800-160 MG per tablet; Take 1 tablet by mouth 2 (Two) Times a Day.  Dispense: 10 tablet; Refill: 0      Plan:  1.)  Bactrim DS 1 tab p.o. every 12 x5 days culture and sensitivity pending       Dileep Curtis MD  06/28/2021

## 2021-07-01 LAB
BACTERIA UR CULT: ABNORMAL
BACTERIA UR CULT: ABNORMAL
OTHER ANTIBIOTIC SUSC ISLT: ABNORMAL

## 2021-07-02 ENCOUNTER — TELEPHONE (OUTPATIENT)
Dept: FAMILY MEDICINE CLINIC | Facility: CLINIC | Age: 86
End: 2021-07-02

## 2021-07-02 RX ORDER — SULFAMETHOXAZOLE AND TRIMETHOPRIM 800; 160 MG/1; MG/1
1 TABLET ORAL 2 TIMES DAILY
Qty: 4 TABLET | Refills: 0 | Status: SHIPPED | OUTPATIENT
Start: 2021-07-02 | End: 2021-08-12

## 2021-07-02 NOTE — TELEPHONE ENCOUNTER
Caller: Muller Marla L    Relationship: Self    Best call back number: [phone]    Caller requesting test results: PATIENT    What test was performed: URINE     When was the test performed: Monday 06/28    Where was the test performed: IN OFFICE//LAB    Additional notes: PATIENT IS STILL HAVING PAIN AND RAW FEELING BURNING WHEN URINATING. SHE HAS WAITED ALL WEEK FOR THESE RESULTS AND FIGURED SHED HAVE THEM BY NOW...    Results were discussed with the patient.  She does have a staph saprophyticus.  I did advise her that she is on an appropriate antibiotic.  She is about to finish them tomorrow and she is still having some symptoms.  I sent 2 more additional days of antibiotic to her pharmacy.

## 2021-08-12 ENCOUNTER — OFFICE VISIT (OUTPATIENT)
Dept: FAMILY MEDICINE CLINIC | Facility: CLINIC | Age: 86
End: 2021-08-12

## 2021-08-12 VITALS
SYSTOLIC BLOOD PRESSURE: 130 MMHG | TEMPERATURE: 97.8 F | BODY MASS INDEX: 20.46 KG/M2 | WEIGHT: 127.3 LBS | HEIGHT: 66 IN | HEART RATE: 66 BPM | DIASTOLIC BLOOD PRESSURE: 82 MMHG | OXYGEN SATURATION: 98 %

## 2021-08-12 DIAGNOSIS — E11.65 TYPE 2 DIABETES MELLITUS WITH HYPERGLYCEMIA, WITHOUT LONG-TERM CURRENT USE OF INSULIN (HCC): Primary | ICD-10-CM

## 2021-08-12 DIAGNOSIS — E03.9 ACQUIRED HYPOTHYROIDISM: ICD-10-CM

## 2021-08-12 DIAGNOSIS — I10 BENIGN ESSENTIAL HYPERTENSION: ICD-10-CM

## 2021-08-12 PROCEDURE — 99213 OFFICE O/P EST LOW 20 MIN: CPT | Performed by: INTERNAL MEDICINE

## 2021-08-12 RX ORDER — CHOLECALCIFEROL (VITAMIN D3) 125 MCG
5 CAPSULE ORAL
COMMUNITY

## 2021-08-12 RX ORDER — METOPROLOL SUCCINATE 50 MG/1
50 TABLET, EXTENDED RELEASE ORAL DAILY
Qty: 90 TABLET | Refills: 1 | Status: SHIPPED | OUTPATIENT
Start: 2021-08-12 | End: 2022-02-18

## 2021-08-12 NOTE — PROGRESS NOTES
Subjective Chief complaint is follow-up on thyroid and diabetes  Marla Muller is a 89 y.o. female.     History of Present Illness Marla is here today for follow-up.  At her last visit her A1c was just a little bit elevated at 6.6.  She does report that she has been trying to do better on the institutionalized food at the assisted living facility.  She has lost approximately 6 pounds since her last visit.  She does have some hypothyroidism.  She is on a low dose of Synthroid.  Her last TSH was slightly elevated at 5.65.  She is not noticing any problems in terms of fatigue or lack of energy.  Her blood pressure louis fairly well controlled on the irbesartan.    The following portions of the patient's history were reviewed and updated as appropriate: allergies, current medications, past family history, past medical history, past social history, past surgical history and problem list.    Review of Systems   Constitutional: Negative for chills and fever.   Respiratory: Negative for chest tightness and shortness of breath.    Neurological: Negative for dizziness, light-headedness and headache.       Objective   Physical Exam  Constitutional:       Appearance: Normal appearance. She is well-developed.   Neck:      Thyroid: No thyromegaly.      Vascular: No carotid bruit.   Cardiovascular:      Rate and Rhythm: Normal rate and regular rhythm.      Heart sounds: Normal heart sounds. No murmur heard.   No friction rub.   Pulmonary:      Effort: Pulmonary effort is normal. No respiratory distress.      Breath sounds: Normal breath sounds. No wheezing.   Abdominal:      General: Bowel sounds are normal. There is no distension.      Palpations: Abdomen is soft. There is no mass.      Tenderness: There is no abdominal tenderness. There is no guarding.   Musculoskeletal:      Right lower leg: No edema.      Left lower leg: No edema.   Neurological:      Mental Status: She is alert.           Assessment/Plan   Diagnoses and all  orders for this visit:    1. Type 2 diabetes mellitus with hyperglycemia, without long-term current use of insulin (CMS/[facility]) (Primary)  -     Comprehensive Metabolic Panel  -     Hemoglobin A1c    2. Benign essential hypertension  -     metoprolol succinate XL (TOPROL-XL) 50 MG 24 hr tablet; Take 1 tablet by mouth Daily.  Dispense: 90 tablet; Refill: 1  -     CBC & Differential    3. Acquired hypothyroidism  -     TSH+Free T4  -     T3, Free      Marla is here today for follow-up.  We are going to check on status of her diabetes and thyroid.  She seems to be doing well.  If all is well we will see her back in 6 months.  She did recover from her recent urinary tract infection.

## 2021-08-13 LAB
ALBUMIN SERPL-MCNC: 4.6 G/DL (ref 3.5–5.2)
ALBUMIN/GLOB SERPL: 1.8 G/DL
ALP SERPL-CCNC: 80 U/L (ref 39–117)
ALT SERPL-CCNC: 21 U/L (ref 1–33)
AST SERPL-CCNC: 25 U/L (ref 1–32)
BASOPHILS # BLD AUTO: ABNORMAL 10*3/UL
BASOPHILS # BLD MANUAL: 0.16 10*3/MM3 (ref 0–0.2)
BASOPHILS NFR BLD MANUAL: 1 % (ref 0–1.5)
BILIRUB SERPL-MCNC: 0.5 MG/DL (ref 0–1.2)
BUN SERPL-MCNC: 13 MG/DL (ref 8–23)
BUN/CREAT SERPL: 16.7 (ref 7–25)
CALCIUM SERPL-MCNC: 10.5 MG/DL (ref 8.6–10.5)
CHLORIDE SERPL-SCNC: 101 MMOL/L (ref 98–107)
CO2 SERPL-SCNC: 26.6 MMOL/L (ref 22–29)
CREAT SERPL-MCNC: 0.78 MG/DL (ref 0.57–1)
DIFFERENTIAL COMMENT: ABNORMAL
EOSINOPHIL # BLD AUTO: ABNORMAL 10*3/UL
EOSINOPHIL # BLD MANUAL: 0.16 10*3/MM3 (ref 0–0.4)
EOSINOPHIL NFR BLD AUTO: ABNORMAL %
EOSINOPHIL NFR BLD MANUAL: 1 % (ref 0.3–6.2)
ERYTHROCYTE [DISTWIDTH] IN BLOOD BY AUTOMATED COUNT: 12.3 % (ref 12.3–15.4)
GLOBULIN SER CALC-MCNC: 2.5 GM/DL
GLUCOSE SERPL-MCNC: 144 MG/DL (ref 65–99)
HBA1C MFR BLD: 6.8 % (ref 4.8–5.6)
HCT VFR BLD AUTO: 38.3 % (ref 34–46.6)
HGB BLD-MCNC: 13.2 G/DL (ref 12–15.9)
LYMPHOCYTES # BLD AUTO: ABNORMAL 10*3/UL
LYMPHOCYTES # BLD MANUAL: 9.83 10*3/MM3 (ref 0.7–3.1)
LYMPHOCYTES NFR BLD AUTO: ABNORMAL %
LYMPHOCYTES NFR BLD MANUAL: 62 % (ref 19.6–45.3)
MCH RBC QN AUTO: 30.4 PG (ref 26.6–33)
MCHC RBC AUTO-ENTMCNC: 34.5 G/DL (ref 31.5–35.7)
MCV RBC AUTO: 88.2 FL (ref 79–97)
MONOCYTES # BLD MANUAL: 1.74 10*3/MM3 (ref 0.1–0.9)
MONOCYTES NFR BLD AUTO: ABNORMAL %
MONOCYTES NFR BLD MANUAL: 11 % (ref 5–12)
NEUTROPHILS # BLD MANUAL: 3.97 10*3/MM3 (ref 1.7–7)
NEUTROPHILS NFR BLD AUTO: ABNORMAL %
NEUTROPHILS NFR BLD MANUAL: 25 % (ref 42.7–76)
PLATELET # BLD AUTO: 281 10*3/MM3 (ref 140–450)
PLATELET BLD QL SMEAR: ABNORMAL
POTASSIUM SERPL-SCNC: 6 MMOL/L (ref 3.5–5.2)
PROT SERPL-MCNC: 7.1 G/DL (ref 6–8.5)
RBC # BLD AUTO: 4.34 10*6/MM3 (ref 3.77–5.28)
RBC MORPH BLD: ABNORMAL
SODIUM SERPL-SCNC: 140 MMOL/L (ref 136–145)
T3FREE SERPL-MCNC: 2.6 PG/ML (ref 2–4.4)
T4 FREE SERPL-MCNC: 1.15 NG/DL (ref 0.93–1.7)
TSH SERPL DL<=0.005 MIU/L-ACNC: 4.24 UIU/ML (ref 0.27–4.2)
WBC # BLD AUTO: 15.86 10*3/MM3 (ref 3.4–10.8)

## 2021-11-22 RX ORDER — IRBESARTAN 150 MG/1
TABLET ORAL
Qty: 90 TABLET | Refills: 1 | Status: SHIPPED | OUTPATIENT
Start: 2021-11-22 | End: 2022-05-09

## 2021-11-22 NOTE — TELEPHONE ENCOUNTER
Rx Refill Note  Requested Prescriptions     Pending Prescriptions Disp Refills   • irbesartan (AVAPRO) 150 MG tablet [Pharmacy Med Name: IRBESARTAN TAB 150MG] 90 tablet 1     Sig: TAKE 1 TABLET DAILY      Last office visit with prescribing clinician: 2/23/2021      Next office visit with prescribing clinician: Visit date not found            Michela Ryan MA  11/22/21, 07:33 EST

## 2022-02-02 ENCOUNTER — OFFICE VISIT (OUTPATIENT)
Dept: FAMILY MEDICINE CLINIC | Facility: CLINIC | Age: 87
End: 2022-02-02

## 2022-02-02 VITALS
OXYGEN SATURATION: 99 % | WEIGHT: 126 LBS | SYSTOLIC BLOOD PRESSURE: 118 MMHG | HEIGHT: 66 IN | DIASTOLIC BLOOD PRESSURE: 68 MMHG | HEART RATE: 67 BPM | BODY MASS INDEX: 20.25 KG/M2

## 2022-02-02 DIAGNOSIS — I10 BENIGN ESSENTIAL HYPERTENSION: ICD-10-CM

## 2022-02-02 DIAGNOSIS — E03.9 ACQUIRED HYPOTHYROIDISM: ICD-10-CM

## 2022-02-02 DIAGNOSIS — E11.65 TYPE 2 DIABETES MELLITUS WITH HYPERGLYCEMIA, WITHOUT LONG-TERM CURRENT USE OF INSULIN: Primary | ICD-10-CM

## 2022-02-02 PROCEDURE — 99213 OFFICE O/P EST LOW 20 MIN: CPT | Performed by: INTERNAL MEDICINE

## 2022-02-02 RX ORDER — AMITRIPTYLINE HYDROCHLORIDE 10 MG/1
TABLET, FILM COATED ORAL
COMMUNITY
Start: 2021-12-21 | End: 2022-12-07

## 2022-02-02 NOTE — PROGRESS NOTES
Subjective Chief complaint is follow-up on diabetes  Marla Muller is a 89 y.o. female.     History of Present Illness Marla is here today for follow-up on her non-insulin-dependent diabetes. She really is not taking any medication for this. She is watching her diet. Her weight has remained fairly stable. She does have hypertension. She is on both irbesartan and metoprolol. That seems to be controlling her blood pressure fairly well. She thinks the metoprolol may be constipating her little bit. Her thyroid has been a little bit off. Her TSH has been slightly high but she has been reluctant to increase her dose.    The following portions of the patient's history were reviewed and updated as appropriate: allergies, current medications, past family history, past medical history, past social history, past surgical history and problem list.    Review of Systems   Constitutional: Negative for chills and fever.   Respiratory: Negative for chest tightness and shortness of breath.    Cardiovascular: Negative for chest pain.   Neurological: Negative for dizziness, light-headedness and headache.       Objective   Physical Exam  Vitals and nursing note reviewed.   Cardiovascular:      Rate and Rhythm: Normal rate and regular rhythm.      Pulses: Normal pulses.   Pulmonary:      Effort: Pulmonary effort is normal.   Abdominal:      General: Abdomen is flat. Bowel sounds are normal.      Tenderness: There is no abdominal tenderness. There is no guarding or rebound.   Musculoskeletal:      Right lower leg: No edema.      Left lower leg: No edema.   Neurological:      Mental Status: She is alert.           Assessment/Plan   Diagnoses and all orders for this visit:    1. Type 2 diabetes mellitus with hyperglycemia, without long-term current use of insulin (HCC) (Primary)  -     Comprehensive Metabolic Panel  -     Hemoglobin A1c  -     Lipid Panel    2. Benign essential hypertension  -     CBC & Differential    3. Acquired  hypothyroidism  -     TSH+Free T4  -     T3, Free    Marla is here today for follow-up. She seems to be doing well. We are going to continue her current medications. We will check on status of her diabetes today.

## 2022-02-03 LAB
ALBUMIN SERPL-MCNC: 4.7 G/DL (ref 3.6–4.6)
ALBUMIN/GLOB SERPL: 2 {RATIO} (ref 1.2–2.2)
ALP SERPL-CCNC: 79 IU/L (ref 44–121)
ALT SERPL-CCNC: 18 IU/L (ref 0–32)
AST SERPL-CCNC: 22 IU/L (ref 0–40)
BASOPHILS # BLD AUTO: 0.1 X10E3/UL (ref 0–0.2)
BASOPHILS NFR BLD AUTO: 1 %
BILIRUB SERPL-MCNC: 0.5 MG/DL (ref 0–1.2)
BUN SERPL-MCNC: 19 MG/DL (ref 8–27)
BUN/CREAT SERPL: 28 (ref 12–28)
CALCIUM SERPL-MCNC: 10.2 MG/DL (ref 8.7–10.3)
CHLORIDE SERPL-SCNC: 100 MMOL/L (ref 96–106)
CHOLEST SERPL-MCNC: 173 MG/DL (ref 100–199)
CO2 SERPL-SCNC: 25 MMOL/L (ref 20–29)
CREAT SERPL-MCNC: 0.67 MG/DL (ref 0.57–1)
EOSINOPHIL # BLD AUTO: 0.1 X10E3/UL (ref 0–0.4)
EOSINOPHIL NFR BLD AUTO: 1 %
ERYTHROCYTE [DISTWIDTH] IN BLOOD BY AUTOMATED COUNT: 12.4 % (ref 11.7–15.4)
GLOBULIN SER CALC-MCNC: 2.3 G/DL (ref 1.5–4.5)
GLUCOSE SERPL-MCNC: 137 MG/DL (ref 65–99)
HBA1C MFR BLD: 6.8 % (ref 4.8–5.6)
HCT VFR BLD AUTO: 40 % (ref 34–46.6)
HDLC SERPL-MCNC: 62 MG/DL
HGB BLD-MCNC: 13.1 G/DL (ref 11.1–15.9)
IMM GRANULOCYTES # BLD AUTO: 0 X10E3/UL (ref 0–0.1)
IMM GRANULOCYTES NFR BLD AUTO: 0 %
LDLC SERPL CALC-MCNC: 100 MG/DL (ref 0–99)
LYMPHOCYTES # BLD AUTO: 11 X10E3/UL (ref 0.7–3.1)
LYMPHOCYTES NFR BLD AUTO: 65 %
MCH RBC QN AUTO: 29.4 PG (ref 26.6–33)
MCHC RBC AUTO-ENTMCNC: 32.8 G/DL (ref 31.5–35.7)
MCV RBC AUTO: 90 FL (ref 79–97)
MONOCYTES # BLD AUTO: 0.9 X10E3/UL (ref 0.1–0.9)
MONOCYTES NFR BLD AUTO: 5 %
MORPHOLOGY BLD-IMP: ABNORMAL
NEUTROPHILS # BLD AUTO: 4.7 X10E3/UL (ref 1.4–7)
NEUTROPHILS NFR BLD AUTO: 28 %
PLATELET # BLD AUTO: 266 X10E3/UL (ref 150–450)
POTASSIUM SERPL-SCNC: 5.4 MMOL/L (ref 3.5–5.2)
PROT SERPL-MCNC: 7 G/DL (ref 6–8.5)
RBC # BLD AUTO: 4.46 X10E6/UL (ref 3.77–5.28)
SODIUM SERPL-SCNC: 138 MMOL/L (ref 134–144)
T3FREE SERPL-MCNC: 2.4 PG/ML (ref 2–4.4)
T4 FREE SERPL-MCNC: 1.15 NG/DL (ref 0.82–1.77)
TRIGL SERPL-MCNC: 54 MG/DL (ref 0–149)
TSH SERPL DL<=0.005 MIU/L-ACNC: 5.14 UIU/ML (ref 0.45–4.5)
VLDLC SERPL CALC-MCNC: 11 MG/DL (ref 5–40)
WBC # BLD AUTO: 16.7 X10E3/UL (ref 3.4–10.8)

## 2022-02-03 RX ORDER — LEVOTHYROXINE SODIUM 50 MCG
TABLET ORAL
Qty: 115 TABLET | Refills: 1 | Status: SHIPPED | OUTPATIENT
Start: 2022-02-03 | End: 2022-07-07

## 2022-02-18 DIAGNOSIS — I10 BENIGN ESSENTIAL HYPERTENSION: ICD-10-CM

## 2022-02-18 RX ORDER — METOPROLOL SUCCINATE 50 MG/1
TABLET, EXTENDED RELEASE ORAL
Qty: 90 TABLET | Refills: 1 | Status: SHIPPED | OUTPATIENT
Start: 2022-02-18 | End: 2022-07-25

## 2022-02-18 NOTE — TELEPHONE ENCOUNTER
Rx Refill Note  Requested Prescriptions     Pending Prescriptions Disp Refills   • metoprolol succinate XL (TOPROL-XL) 50 MG 24 hr tablet [Pharmacy Med Name: METOPROL SUC TAB 50MG ER] 90 tablet 1     Sig: TAKE 1 TABLET DAILY      Last office visit with prescribing clinician: 2/2/2022      Next office visit with prescribing clinician: Visit date not found            Michela Ryan MA  02/18/22, 08:41 EST

## 2022-04-25 ENCOUNTER — OFFICE VISIT (OUTPATIENT)
Dept: FAMILY MEDICINE CLINIC | Facility: CLINIC | Age: 87
End: 2022-04-25

## 2022-04-25 VITALS
HEIGHT: 66 IN | OXYGEN SATURATION: 97 % | DIASTOLIC BLOOD PRESSURE: 72 MMHG | SYSTOLIC BLOOD PRESSURE: 160 MMHG | HEART RATE: 70 BPM | WEIGHT: 130 LBS | BODY MASS INDEX: 20.89 KG/M2

## 2022-04-25 DIAGNOSIS — K59.00 CONSTIPATION, UNSPECIFIED CONSTIPATION TYPE: ICD-10-CM

## 2022-04-25 DIAGNOSIS — E11.65 TYPE 2 DIABETES MELLITUS WITH HYPERGLYCEMIA, WITHOUT LONG-TERM CURRENT USE OF INSULIN: ICD-10-CM

## 2022-04-25 DIAGNOSIS — E03.9 ACQUIRED HYPOTHYROIDISM: Primary | ICD-10-CM

## 2022-04-25 DIAGNOSIS — R30.0 DYSURIA: ICD-10-CM

## 2022-04-25 PROCEDURE — 99214 OFFICE O/P EST MOD 30 MIN: CPT | Performed by: INTERNAL MEDICINE

## 2022-04-25 NOTE — PROGRESS NOTES
Subjective If complaint is checkup on thyroid  Marla Muller is a 89 y.o. female.     History of Present Illness Marla is here today for follow-up on her thyroid.  At her visit in February we did alter her thyroid dose because her TSH was starting to climb.  She reports that she may feel little bit more anxious and jittery since this increase.  Her weight however has increased.  She seems to be a little more constipated than usual.  When she is constipated have affects her ability to urinate.  She is having some discomfort with urination.  It is difficult to tell how much of this might be a urinary tract infection how much might just be pelvic floor symptoms.  We did discuss adding some Senokot to her use of Metamucil.    The following portions of the patient's history were reviewed and updated as appropriate: allergies, current medications, past family history, past medical history, past social history, past surgical history and problem list.    Review of Systems   Constitutional: Negative for chills and fever.   Respiratory: Negative for cough.    Gastrointestinal: Positive for constipation.   Genitourinary: Positive for dysuria.       Objective   Physical Exam  Vitals and nursing note reviewed.   Constitutional:       Appearance: Normal appearance.   Cardiovascular:      Rate and Rhythm: Normal rate and regular rhythm.   Pulmonary:      Effort: Pulmonary effort is normal.   Abdominal:      General: Bowel sounds are normal.      Palpations: Abdomen is soft.      Tenderness: There is no abdominal tenderness. There is no guarding or rebound.   Genitourinary:     Comments: She does have some mild suprapubic tenderness  Neurological:      Mental Status: She is alert.           Assessment/Plan   Diagnoses and all orders for this visit:    1. Acquired hypothyroidism (Primary)  -     TSH+Free T4  -     T3, Free    2. Type 2 diabetes mellitus with hyperglycemia, without long-term current use of insulin (HCC)    3.  Constipation, unspecified constipation type    4. Dysuria  -     Urinalysis With Microscopic - Urine, Clean Catch  -     Urine Culture - Urine, Urine, Clean Catch      Marla is here today for follow-up on her thyroid.  We will see if she needs any adjustment there.  We did advise her to use some Senokot along with her Metamucil.  If she shows a urinary tract infection we will treat it.  If it does not we will attribute a lot of her symptoms to pelvic floor dysfunction.

## 2022-04-27 LAB
APPEARANCE UR: CLEAR
BACTERIA #/AREA URNS HPF: NORMAL /[HPF]
BACTERIA UR CULT: NORMAL
BACTERIA UR CULT: NORMAL
BILIRUB UR QL STRIP: NEGATIVE
CASTS URNS QL MICRO: NORMAL /LPF
COLOR UR: YELLOW
EPI CELLS #/AREA URNS HPF: NORMAL /HPF (ref 0–10)
GLUCOSE UR QL STRIP: NEGATIVE
HGB UR QL STRIP: NEGATIVE
KETONES UR QL STRIP: NEGATIVE
LEUKOCYTE ESTERASE UR QL STRIP: NEGATIVE
MICRO URNS: NORMAL
MICRO URNS: NORMAL
NITRITE UR QL STRIP: NEGATIVE
PH UR STRIP: 6.5 [PH] (ref 5–7.5)
PROT UR QL STRIP: NEGATIVE
RBC #/AREA URNS HPF: NORMAL /HPF (ref 0–2)
SP GR UR STRIP: 1.01 (ref 1–1.03)
T3FREE SERPL-MCNC: 2.2 PG/ML (ref 2–4.4)
T4 FREE SERPL-MCNC: 1.17 NG/DL (ref 0.82–1.77)
TSH SERPL DL<=0.005 MIU/L-ACNC: 4.14 UIU/ML (ref 0.45–4.5)
UROBILINOGEN UR STRIP-MCNC: 0.2 MG/DL (ref 0.2–1)
WBC #/AREA URNS HPF: NORMAL /HPF (ref 0–5)

## 2022-05-09 RX ORDER — IRBESARTAN 150 MG/1
TABLET ORAL
Qty: 90 TABLET | Refills: 1 | Status: SHIPPED | OUTPATIENT
Start: 2022-05-09 | End: 2022-11-09

## 2022-05-09 NOTE — TELEPHONE ENCOUNTER
Rx Refill Note  Requested Prescriptions     Pending Prescriptions Disp Refills   • irbesartan (AVAPRO) 150 MG tablet [Pharmacy Med Name: IRBESARTAN TAB 150MG] 90 tablet 1     Sig: TAKE 1 TABLET DAILY      Last office visit with prescribing clinician: 4/25/2022      Next office visit with prescribing clinician: Visit date not found            Barrett Aponte MA  05/09/22, 07:18 EDT

## 2022-07-07 RX ORDER — LEVOTHYROXINE SODIUM 50 MCG
TABLET ORAL
Qty: 90 TABLET | Refills: 3 | Status: SHIPPED | OUTPATIENT
Start: 2022-07-07

## 2022-07-23 DIAGNOSIS — I10 BENIGN ESSENTIAL HYPERTENSION: ICD-10-CM

## 2022-07-25 RX ORDER — METOPROLOL SUCCINATE 50 MG/1
TABLET, EXTENDED RELEASE ORAL
Qty: 90 TABLET | Refills: 1 | Status: SHIPPED | OUTPATIENT
Start: 2022-07-25 | End: 2022-12-07 | Stop reason: SDUPTHER

## 2022-09-14 DIAGNOSIS — E11.65 TYPE 2 DIABETES MELLITUS WITH HYPERGLYCEMIA, WITHOUT LONG-TERM CURRENT USE OF INSULIN: Primary | ICD-10-CM

## 2022-09-26 ENCOUNTER — TELEPHONE (OUTPATIENT)
Dept: FAMILY MEDICINE CLINIC | Facility: CLINIC | Age: 87
End: 2022-09-26

## 2022-09-26 DIAGNOSIS — E11.65 TYPE 2 DIABETES MELLITUS WITH HYPERGLYCEMIA, WITHOUT LONG-TERM CURRENT USE OF INSULIN: ICD-10-CM

## 2022-09-26 NOTE — TELEPHONE ENCOUNTER
Caller: Marla Muller    Relationship: Self    Best call back number: [phone]    Requested Prescriptions:   Requested Prescriptions     Pending Prescriptions Disp Refills   • glucose blood test strip 100 each 3     Si each by Other route Daily. Patient has one touch glucometer        Pharmacy where request should be sent: [facility] [address] [place] [address] - [phone]  - [phone] FX     Additional details provided by patient: PATIENT IS ALMOST OUT OF HER TEST STRIPS.  Does the patient have less than a 3 day supply:  [x] Yes  [] No    Alyssa Danielle Rep   22 10:27 EDT

## 2022-11-09 RX ORDER — IRBESARTAN 150 MG/1
TABLET ORAL
Qty: 90 TABLET | Refills: 1 | Status: SHIPPED | OUTPATIENT
Start: 2022-11-09

## 2022-11-09 NOTE — TELEPHONE ENCOUNTER
Rx Refill Note  Requested Prescriptions     Pending Prescriptions Disp Refills   • irbesartan (AVAPRO) 150 MG tablet [Pharmacy Med Name: IRBESARTAN TAB 150MG] 90 tablet 1     Sig: TAKE 1 TABLET DAILY      Last office visit with prescribing clinician: 4/25/2022      Next office visit with prescribing clinician: 12/7/2022            Barrett Aponte MA  11/09/22, 07:34 EST

## 2022-12-07 ENCOUNTER — OFFICE VISIT (OUTPATIENT)
Dept: FAMILY MEDICINE CLINIC | Facility: CLINIC | Age: 87
End: 2022-12-07

## 2022-12-07 ENCOUNTER — TELEPHONE (OUTPATIENT)
Dept: FAMILY MEDICINE CLINIC | Facility: CLINIC | Age: 87
End: 2022-12-07

## 2022-12-07 VITALS
HEART RATE: 71 BPM | DIASTOLIC BLOOD PRESSURE: 60 MMHG | OXYGEN SATURATION: 98 % | SYSTOLIC BLOOD PRESSURE: 160 MMHG | HEIGHT: 66 IN | BODY MASS INDEX: 21.05 KG/M2 | WEIGHT: 131 LBS

## 2022-12-07 DIAGNOSIS — E03.9 ACQUIRED HYPOTHYROIDISM: ICD-10-CM

## 2022-12-07 DIAGNOSIS — E11.65 TYPE 2 DIABETES MELLITUS WITH HYPERGLYCEMIA, WITHOUT LONG-TERM CURRENT USE OF INSULIN: Primary | ICD-10-CM

## 2022-12-07 DIAGNOSIS — I10 BENIGN ESSENTIAL HYPERTENSION: ICD-10-CM

## 2022-12-07 DIAGNOSIS — K57.90 DIVERTICULOSIS: ICD-10-CM

## 2022-12-07 DIAGNOSIS — R10.32 LEFT LOWER QUADRANT PAIN: ICD-10-CM

## 2022-12-07 PROCEDURE — 99213 OFFICE O/P EST LOW 20 MIN: CPT | Performed by: INTERNAL MEDICINE

## 2022-12-07 RX ORDER — METOPROLOL SUCCINATE 50 MG/1
50 TABLET, EXTENDED RELEASE ORAL DAILY
Qty: 90 TABLET | Refills: 1 | Status: SHIPPED | OUTPATIENT
Start: 2022-12-07 | End: 2022-12-27

## 2022-12-07 NOTE — PROGRESS NOTES
Subjective Chief complaint is checkup on diabetes but also left lower quadrant pain  Marla Muller is a 89 y.o. female.     History of Present Illness Marla is here today for checkup on her diabetes.  That seems to be doing okay on her current regimen.  She also has hypertension that is well controlled and her thyroid has been appropriately regulated.  She is complaining of some left lower quadrant pain.  She has had this for years.  She has multiple abdominal surgeries.  She no longer has her uterus and ovaries.  She does not have an appendix or gallbladder.  She had a colonoscopy approximately 20 years ago that did show some tortuosity to the sigmoid colon.  She does struggle with constipation.    The following portions of the patient's history were reviewed and updated as appropriate: allergies, current medications, past family history, past medical history, past social history, past surgical history and problem list.    Review of Systems   Constitutional: Negative for chills and fever.   Respiratory: Negative for chest tightness and shortness of breath.    Cardiovascular: Negative for chest pain.   Gastrointestinal: Positive for constipation. Negative for blood in stool.       Objective   Physical Exam  Vitals and nursing note reviewed.   Constitutional:       Appearance: Normal appearance.   Neck:      Vascular: No carotid bruit.   Cardiovascular:      Rate and Rhythm: Normal rate and regular rhythm.      Pulses: Normal pulses.   Pulmonary:      Effort: Pulmonary effort is normal.      Breath sounds: No wheezing, rhonchi or rales.   Abdominal:      General: There is no distension.      Palpations: Abdomen is soft. There is no mass.      Tenderness: There is abdominal tenderness.      Comments: There is a fairly distinct palpable air bubble in the left lower quadrant.   Neurological:      Mental Status: She is alert.           Assessment & Plan   Diagnoses and all orders for this visit:    1. Type 2 diabetes  mellitus with hyperglycemia, without long-term current use of insulin (HCC) (Primary)  -     Comprehensive Metabolic Panel  -     Hemoglobin A1c    2. Acquired hypothyroidism  -     TSH+Free T4    3. Benign essential hypertension  -     CBC & Differential  -     metoprolol succinate XL (TOPROL-XL) 50 MG 24 hr tablet; Take 1 tablet by mouth Daily.  Dispense: 90 tablet; Refill: 1    4. Left lower quadrant pain  -     CBC & Differential  -     CT Abdomen Pelvis With Contrast; Future    5. Diverticulosis  -     CT Abdomen Pelvis With Contrast; Future    Marla is here today for some left lower quadrant pain.  She has known diverticulosis and a tortuous sigmoid colon.  I am going to get a CT scan and take a look at this area.  We will also check a CBC today.  Her diabetes and thyroid will be checked today as well.

## 2022-12-07 NOTE — TELEPHONE ENCOUNTER
Caller: Marla Muller    Relationship: Self    Best call back number:[phone]    Who are you requesting to speak with (clinical staff, provider,  specific staff member): CLINICAL STAFF     What was the call regarding: RETURNING CALL TO OFFICE NOT SURE WHO WAS TRYING TO CALL HER NO MESSAGE LEFT     Do you require a callback: YES

## 2022-12-08 LAB
ALBUMIN SERPL-MCNC: 4.7 G/DL (ref 3.5–5.2)
ALBUMIN/GLOB SERPL: 2.5 G/DL
ALP SERPL-CCNC: 92 U/L (ref 39–117)
ALT SERPL-CCNC: 15 U/L (ref 1–33)
AST SERPL-CCNC: 19 U/L (ref 1–32)
BASOPHILS # BLD AUTO: ABNORMAL 10*3/UL
BILIRUB SERPL-MCNC: 0.6 MG/DL (ref 0–1.2)
BUN SERPL-MCNC: 14 MG/DL (ref 8–23)
BUN/CREAT SERPL: 20.9 (ref 7–25)
CALCIUM SERPL-MCNC: 9.6 MG/DL (ref 8.2–9.6)
CHLORIDE SERPL-SCNC: 103 MMOL/L (ref 98–107)
CO2 SERPL-SCNC: 29.2 MMOL/L (ref 22–29)
CREAT SERPL-MCNC: 0.67 MG/DL (ref 0.57–1)
DIFFERENTIAL COMMENT: ABNORMAL
EGFRCR SERPLBLD CKD-EPI 2021: 83.1 ML/MIN/1.73
EOSINOPHIL # BLD AUTO: ABNORMAL 10*3/UL
EOSINOPHIL # BLD MANUAL: 0.16 10*3/MM3 (ref 0–0.4)
EOSINOPHIL NFR BLD AUTO: ABNORMAL %
EOSINOPHIL NFR BLD MANUAL: 1 % (ref 0.3–6.2)
ERYTHROCYTE [DISTWIDTH] IN BLOOD BY AUTOMATED COUNT: 12.6 % (ref 12.3–15.4)
GLOBULIN SER CALC-MCNC: 1.9 GM/DL
GLUCOSE SERPL-MCNC: 139 MG/DL (ref 65–99)
HBA1C MFR BLD: 7 % (ref 4.8–5.6)
HCT VFR BLD AUTO: 38.1 % (ref 34–46.6)
HGB BLD-MCNC: 12.7 G/DL (ref 12–15.9)
LYMPHOCYTES # BLD AUTO: ABNORMAL 10*3/UL
LYMPHOCYTES # BLD MANUAL: 8.41 10*3/MM3 (ref 0.7–3.1)
LYMPHOCYTES NFR BLD AUTO: ABNORMAL %
LYMPHOCYTES NFR BLD MANUAL: 52 % (ref 19.6–45.3)
MCH RBC QN AUTO: 30.1 PG (ref 26.6–33)
MCHC RBC AUTO-ENTMCNC: 33.3 G/DL (ref 31.5–35.7)
MCV RBC AUTO: 90.3 FL (ref 79–97)
MONOCYTES # BLD MANUAL: 1.29 10*3/MM3 (ref 0.1–0.9)
MONOCYTES NFR BLD AUTO: ABNORMAL %
MONOCYTES NFR BLD MANUAL: 8 % (ref 5–12)
NEUTROPHILS # BLD MANUAL: 6.31 10*3/MM3 (ref 1.7–7)
NEUTROPHILS NFR BLD AUTO: ABNORMAL %
NEUTROPHILS NFR BLD MANUAL: 39 % (ref 42.7–76)
PLATELET # BLD AUTO: 208 10*3/MM3 (ref 140–450)
PLATELET BLD QL SMEAR: ABNORMAL
POTASSIUM SERPL-SCNC: 4.5 MMOL/L (ref 3.5–5.2)
PROT SERPL-MCNC: 6.6 G/DL (ref 6–8.5)
RBC # BLD AUTO: 4.22 10*6/MM3 (ref 3.77–5.28)
RBC MORPH BLD: ABNORMAL
SODIUM SERPL-SCNC: 140 MMOL/L (ref 136–145)
T4 FREE SERPL-MCNC: 1.13 NG/DL (ref 0.93–1.7)
TSH SERPL DL<=0.005 MIU/L-ACNC: 4.8 UIU/ML (ref 0.27–4.2)
WBC # BLD AUTO: 16.17 10*3/MM3 (ref 3.4–10.8)

## 2022-12-08 NOTE — TELEPHONE ENCOUNTER
Hub please inform patient called no answer no way to leave vm , please let pt know that we are unaware who called her

## 2022-12-27 DIAGNOSIS — I10 BENIGN ESSENTIAL HYPERTENSION: ICD-10-CM

## 2022-12-27 RX ORDER — METOPROLOL SUCCINATE 50 MG/1
TABLET, EXTENDED RELEASE ORAL
Qty: 90 TABLET | Refills: 1 | Status: SHIPPED | OUTPATIENT
Start: 2022-12-27

## 2022-12-30 ENCOUNTER — APPOINTMENT (OUTPATIENT)
Dept: CT IMAGING | Facility: HOSPITAL | Age: 87
End: 2022-12-30

## 2023-01-03 ENCOUNTER — HOSPITAL ENCOUNTER (OUTPATIENT)
Dept: CT IMAGING | Facility: HOSPITAL | Age: 88
Discharge: HOME | End: 2023-01-03
Admitting: INTERNAL MEDICINE
Payer: MEDICARE

## 2023-01-03 DIAGNOSIS — K57.90 DIVERTICULOSIS: ICD-10-CM

## 2023-01-03 DIAGNOSIS — R10.32 LEFT LOWER QUADRANT PAIN: ICD-10-CM

## 2023-01-03 PROCEDURE — 74177 CT ABD & PELVIS W/CONTRAST: CPT

## 2023-01-03 PROCEDURE — 25010000002 IOPAMIDOL 61 % SOLUTION: Performed by: INTERNAL MEDICINE

## 2023-01-03 RX ADMIN — IOPAMIDOL 90 ML: 612 INJECTION, SOLUTION INTRAVENOUS at 11:37

## 2023-01-05 ENCOUNTER — TELEPHONE (OUTPATIENT)
Dept: FAMILY MEDICINE CLINIC | Facility: CLINIC | Age: 88
End: 2023-01-05

## 2023-01-05 DIAGNOSIS — C80.0 CARCINOMATOSIS: ICD-10-CM

## 2023-01-05 DIAGNOSIS — R19.00 PELVIC MASS: Primary | ICD-10-CM

## 2023-01-05 NOTE — TELEPHONE ENCOUNTER
Caller: Marla Muller    Relationship: Self    Best call back number: [phone]    Caller requesting test results:PATIENT    What test was performed: CAT SCAN     When was the test performed: 1/3/23    Where was the test performed: [facility]     Additional notes: CANNOT ACCESS [facility]. WOULD LIKE A CALL WITH RESULTS.

## 2023-01-16 ENCOUNTER — CONSULT (OUTPATIENT)
Dept: ONCOLOGY | Facility: CLINIC | Age: 88
End: 2023-01-16
Payer: MEDICARE

## 2023-01-16 ENCOUNTER — LAB (OUTPATIENT)
Dept: LAB | Facility: HOSPITAL | Age: 88
End: 2023-01-16
Payer: MEDICARE

## 2023-01-16 VITALS
SYSTOLIC BLOOD PRESSURE: 180 MMHG | TEMPERATURE: 97.3 F | DIASTOLIC BLOOD PRESSURE: 84 MMHG | WEIGHT: 130.9 LBS | BODY MASS INDEX: 22.35 KG/M2 | HEIGHT: 64 IN | HEART RATE: 63 BPM | RESPIRATION RATE: 16 BRPM | OXYGEN SATURATION: 99 %

## 2023-01-16 DIAGNOSIS — C91.10 CLL (CHRONIC LYMPHOCYTIC LEUKEMIA): ICD-10-CM

## 2023-01-16 DIAGNOSIS — N83.8 OVARIAN MASS, LEFT: Primary | ICD-10-CM

## 2023-01-16 DIAGNOSIS — R97.8 OTHER ABNORMAL TUMOR MARKERS: ICD-10-CM

## 2023-01-16 DIAGNOSIS — R19.00 PELVIC MASS: Primary | ICD-10-CM

## 2023-01-16 DIAGNOSIS — C56.2 MALIGNANT NEOPLASM OF LEFT OVARY: ICD-10-CM

## 2023-01-16 DIAGNOSIS — N83.8 OVARIAN MASS, LEFT: ICD-10-CM

## 2023-01-16 LAB
ALBUMIN SERPL-MCNC: 4.8 G/DL (ref 3.5–5.2)
ALBUMIN/GLOB SERPL: 1.8 G/DL (ref 1.1–2.4)
ALP SERPL-CCNC: 89 U/L (ref 38–116)
ALT SERPL W P-5'-P-CCNC: 16 U/L (ref 0–33)
ANION GAP SERPL CALCULATED.3IONS-SCNC: 13.6 MMOL/L (ref 5–15)
AST SERPL-CCNC: 23 U/L (ref 0–32)
BILIRUB SERPL-MCNC: 0.5 MG/DL (ref 0.2–1.2)
BUN SERPL-MCNC: 19 MG/DL (ref 6–20)
BUN/CREAT SERPL: 27.9 (ref 7.3–30)
CALCIUM SPEC-SCNC: 9.7 MG/DL (ref 8.5–10.2)
CANCER AG125 SERPL QL: 197 U/ML (ref 0–38.1)
CEA SERPL-MCNC: 2.81 NG/ML
CHLORIDE SERPL-SCNC: 94 MMOL/L (ref 98–107)
CO2 SERPL-SCNC: 25.4 MMOL/L (ref 22–29)
CREAT SERPL-MCNC: 0.68 MG/DL (ref 0.6–1.1)
DEPRECATED RDW RBC AUTO: 40.2 FL (ref 37–54)
EGFRCR SERPLBLD CKD-EPI 2021: 82.9 ML/MIN/1.73
ERYTHROCYTE [DISTWIDTH] IN BLOOD BY AUTOMATED COUNT: 12.6 % (ref 12.3–15.4)
GLOBULIN UR ELPH-MCNC: 2.6 GM/DL (ref 1.8–3.5)
GLUCOSE SERPL-MCNC: 122 MG/DL (ref 74–124)
HCT VFR BLD AUTO: 39 % (ref 34–46.6)
HGB BLD-MCNC: 13.5 G/DL (ref 12–15.9)
LYMPHOCYTES # BLD MANUAL: 20.76 10*3/MM3 (ref 0.7–3.1)
LYMPHOCYTES NFR BLD MANUAL: 3 % (ref 5–12)
MCH RBC QN AUTO: 30.2 PG (ref 26.6–33)
MCHC RBC AUTO-ENTMCNC: 34.6 G/DL (ref 31.5–35.7)
MCV RBC AUTO: 87.2 FL (ref 79–97)
MONOCYTES # BLD: 0.93 10*3/MM3 (ref 0.1–0.9)
NEUTROPHILS # BLD AUTO: 9.3 10*3/MM3 (ref 1.7–7)
NEUTROPHILS NFR BLD MANUAL: 30 % (ref 42.7–76)
PLAT MORPH BLD: NORMAL
PLATELET # BLD AUTO: 276 10*3/MM3 (ref 140–450)
PMV BLD AUTO: 10.1 FL (ref 6–12)
POTASSIUM SERPL-SCNC: 4.2 MMOL/L (ref 3.5–4.7)
PROT SERPL-MCNC: 7.4 G/DL (ref 6.3–8)
RBC # BLD AUTO: 4.47 10*6/MM3 (ref 3.77–5.28)
RBC MORPH BLD: NORMAL
SODIUM SERPL-SCNC: 133 MMOL/L (ref 134–145)
VARIANT LYMPHS NFR BLD MANUAL: 60 % (ref 19.6–45.3)
VARIANT LYMPHS NFR BLD MANUAL: 7 % (ref 0–5)
WBC MORPH BLD: NORMAL
WBC NRBC COR # BLD: 30.99 10*3/MM3 (ref 3.4–10.8)

## 2023-01-16 PROCEDURE — 82378 CARCINOEMBRYONIC ANTIGEN: CPT | Performed by: INTERNAL MEDICINE

## 2023-01-16 PROCEDURE — 85025 COMPLETE CBC W/AUTO DIFF WBC: CPT

## 2023-01-16 PROCEDURE — 86304 IMMUNOASSAY TUMOR CA 125: CPT | Performed by: INTERNAL MEDICINE

## 2023-01-16 PROCEDURE — 80053 COMPREHEN METABOLIC PANEL: CPT | Performed by: INTERNAL MEDICINE

## 2023-01-16 PROCEDURE — 36415 COLL VENOUS BLD VENIPUNCTURE: CPT

## 2023-01-16 PROCEDURE — 85007 BL SMEAR W/DIFF WBC COUNT: CPT

## 2023-01-16 PROCEDURE — 99204 OFFICE O/P NEW MOD 45 MIN: CPT | Performed by: INTERNAL MEDICINE

## 2023-01-16 NOTE — PROGRESS NOTES
Subjective     REASON FOR CONSULTATION:   1.  New finding of probable ovarian carcinoma based on CT of the abdomen pelvis 1/3/2023  2.  Chronic lymphocytic leukemia.  This appears to be early stage and has not required any treatment.  Original diagnosis was in August 2019.  Provide an opinion on any further workup or treatment                             REQUESTING PHYSICIAN: Candelario Lindsey MD    RECORDS OBTAINED:  Records of the patients history including those obtained from the referring provider were reviewed and summarized in detail.    HISTORY OF PRESENT ILLNESS:  The patient is a 89 y.o. year old female who is here for an opinion about the above issue.  She had previously been seen in our office by Dr. Delgado in August 2019 for lymphocytosis.  She had peripheral blood flow cytometry at that time confirming chronic lymphocytic leukemia.  The patient appeared to have very early stage disease and was asymptomatic with normal hemoglobin and platelet count therefore she has not required any specific intervention and she has not been following up routinely in our office.    She had been seen recently at her primary care office with some complaints of left lower quadrant abdominal discomfort and underwent a CT scan of the abdomen and pelvis on 1/3/2023 with results as noted below showing a cystic mass in the pelvis and evidence of carcinomatosis worrisome for possible primary malignancy with peritoneal involvement.    She was referred back to our office today to discuss the situation.  She is remarkably fit and active for her age.  She continues to drive and does not seem to have any significant cognitive decline.  She is accompanied to the office today by her nephew.    She is not having any severe pain and reports that if she does have discomfort she takes an extra strength Tylenol and gets relief.  Her bowels are moving normally.  She has not had any nausea or vomiting.    We recommended checking a   tumor marker level today and will be referring her to Dr. Childress of GYN oncology.  The patient seems to be fairly resigned to taking a very conservative approach and may not be interested in surgery or chemotherapy but we at least would like for her to have this discussion with the GYN oncologists.    History of Present Illness     Past Medical History:   Diagnosis Date   • Anxiety    • Diabetes mellitus (HCC)     Type 2   • GERD (gastroesophageal reflux disease)    • Hyperlipidemia    • Hypertension    • Hypothyroidism    • Psoriasis         Past Surgical History:   Procedure Laterality Date   • ANTERIOR AND POSTERIOR VAGINAL REPAIR     • APPENDECTOMY     • CHOLECYSTECTOMY  2010   • CYSTOSCOPY  2016    Tension free vaginal taping (TVT)   • HYSTERECTOMY     • OTHER SURGICAL HISTORY  2016    Tension-free vaginal taping   • TONSILLECTOMY          Current Outpatient Medications on File Prior to Visit   Medication Sig Dispense Refill   • Acetaminophen (TYLENOL 8 HOUR PO) Take  by mouth.     • glucose blood test strip 1 each by Other route Daily. Patient has one touch glucometer 100 each 3   • irbesartan (AVAPRO) 150 MG tablet TAKE 1 TABLET DAILY 90 tablet 1   • melatonin 5 MG tablet tablet Take 5 mg by mouth.     • metoprolol succinate XL (TOPROL-XL) 50 MG 24 hr tablet TAKE 1 TABLET DAILY 90 tablet 1   • OneTouch Delica Lancets 33G misc 1 each Daily. 100 each 3   • Synthroid 50 MCG tablet TAKE 1 TABLET DAILY (Patient taking differently: Take 75 mcg by mouth Daily. TAKE 1 TABLET DAILY) 90 tablet 3   • sulfamethoxazole-trimethoprim (BACTRIM DS,SEPTRA DS) 800-160 MG per tablet One by mouth twice a day 14 tablet 0     No current facility-administered medications on file prior to visit.        ALLERGIES:  No Known Allergies     Social History     Socioeconomic History   • Marital status:    • Years of education: Some college   Tobacco Use   • Smoking status: Never   • Smokeless tobacco: Never   Vaping Use   • Vaping  "Use: Never used   Substance and Sexual Activity   • Alcohol use: No   • Drug use: No   • Sexual activity: Defer        Family History   Problem Relation Age of Onset   • Diabetes Mother    • Heart disease Mother    • Hypertension Mother    • Thyroid disease Mother    • Depression Father    • Mental illness Father    • Arthritis Sister    • Depression Sister    • Diabetes Sister    • Hypertension Sister    • Mental illness Sister    • Thyroid disease Sister    • Heart disease Sister    • Heart disease Brother    • Cancer Brother 61        throat   • Diabetes Brother         Review of Systems   Constitutional: Negative for activity change, chills, fatigue and fever.   HENT: Negative for mouth sores, trouble swallowing and voice change.    Eyes: Negative for pain and visual disturbance.   Respiratory: Negative for cough, shortness of breath and wheezing.    Cardiovascular: Negative for chest pain and palpitations.   Gastrointestinal: Positive for abdominal pain. Negative for constipation, diarrhea, nausea and vomiting.        Intermittent left lower quadrant discomfort.   Genitourinary: Negative for difficulty urinating, frequency and urgency.   Musculoskeletal: Negative for arthralgias and joint swelling.   Skin: Negative for rash.   Neurological: Negative for dizziness, seizures, weakness and headaches.   Hematological: Negative for adenopathy. Does not bruise/bleed easily.   Psychiatric/Behavioral: Negative for behavioral problems and confusion. The patient is not nervous/anxious.         Objective     Vitals:    01/16/23 0943 01/16/23 0956   BP: (!) 201/83 180/84   Pulse: 63    Resp: 16    Temp: 97.3 °F (36.3 °C)    TempSrc: Temporal    SpO2: 99%    Weight: 59.4 kg (130 lb 14.4 oz)    Height: 163 cm (64.17\")  Comment: new     PainSc: 0-No pain      Current Status 1/16/2023   ECOG score 0       Physical Exam  Constitutional:       General: She is not in acute distress.     Appearance: She is well-developed. "   HENT:      Head: Normocephalic.   Eyes:      General: No scleral icterus.     Conjunctiva/sclera: Conjunctivae normal.      Pupils: Pupils are equal, round, and reactive to light.   Neck:      Thyroid: No thyromegaly.      Vascular: No JVD.   Cardiovascular:      Rate and Rhythm: Normal rate and regular rhythm.      Heart sounds: No murmur heard.    No friction rub. No gallop.   Pulmonary:      Effort: Pulmonary effort is normal.      Breath sounds: Normal breath sounds. No wheezing or rales.   Abdominal:      General: There is no distension.      Palpations: Abdomen is soft. There is no mass.      Tenderness: There is no abdominal tenderness.   Musculoskeletal:         General: No deformity. Normal range of motion.      Cervical back: Normal range of motion and neck supple.      Comments: Mild arthritic deformities of the hands   Lymphadenopathy:      Cervical: No cervical adenopathy.   Skin:     General: Skin is warm and dry.      Findings: No erythema or rash.   Neurological:      Mental Status: She is alert and oriented to person, place, and time.      Cranial Nerves: No cranial nerve deficit.      Deep Tendon Reflexes: Reflexes are normal and symmetric.   Psychiatric:         Behavior: Behavior normal.         Judgment: Judgment normal.           RECENT LABS:  Hematology WBC   Date Value Ref Range Status   01/16/2023 30.99 (H) 3.40 - 10.80 10*3/mm3 Final   12/07/2022 16.17 (H) 3.40 - 10.80 10*3/mm3 Final   03/12/2019 19.1 (H) 4.0 - 11.0 10*3/uL Final     RBC   Date Value Ref Range Status   01/16/2023 4.47 3.77 - 5.28 10*6/mm3 Final   12/07/2022 4.22 3.77 - 5.28 10*6/mm3 Final   03/12/2019 4.55 4.15 - 4.87 10*6/uL Final     Hemoglobin   Date Value Ref Range Status   01/16/2023 13.5 12.0 - 15.9 g/dL Final   03/12/2019 13.5 12.7 - 14.7 g/dL Final     Hematocrit   Date Value Ref Range Status   01/16/2023 39.0 34.0 - 46.6 % Final   03/12/2019 41.4 37.9 - 43.9 % Final     Platelets   Date Value Ref Range Status    01/16/2023 276 140 - 450 10*3/mm3 Final   03/12/2019 292 150 - 450 10*3/uL Final     Comment:     PLATELETS ADEQUATE        Lab Results   Component Value Date    GLUCOSE 139 (H) 12/07/2022    BUN 14 12/07/2022    CREATININE 0.67 12/07/2022    EGFRIFNONA 78 02/02/2022    EGFRIFAFRI 90 02/02/2022    BCR 20.9 12/07/2022    K 4.5 12/07/2022    CO2 29.2 (H) 12/07/2022    CALCIUM 9.6 12/07/2022    PROTENTOTREF 6.6 12/07/2022    ALBUMIN 4.70 12/07/2022    LABIL2 2.5 12/07/2022    AST 19 12/07/2022    ALT 15 12/07/2022         IMAGING:  CT ABDOMEN AND PELVIS WITH CONTRAST  1/3/2023     HISTORY: 89-year-old female with left lower quadrant abdominal pain.     TECHNIQUE: Axial CT images of the abdomen and pelvis were obtained  following administration of intravenous contrast. The patient was not  given oral contrast. Coronal and sagittal reformats were obtained.     COMPARISON: None available.     FINDINGS: There is a complex cystic mass seen within the left hemipelvis  with approximate dimensions of 12.7 x 5.6 x 7 cm. This mass shows  peripheral eccentric solid component best demonstrated on image 90. This  is most concerning for a complex ovarian malignancy. Omental soft tissue  implants are identified on image 61 measuring up to 4.5 x 2 cm most  consistent with peritoneal carcinomatosis. Small perihepatic ascites is  present. There are enlarged lymph nodes/implants seen within the  periportal, peripancreatic mesentery and within the retroperitoneum.  Index left para-aortic lymph node or mass on image 57 measures 2.3 x 2.3  cm. Enlarged portacaval lymph node measures up to 2.0 x 1.9 cm. Enlarged  lymph nodes particularly seen within the right lower quadrant mesentery  measuring up to 1.2 cm in short axis dimension. No evidence of bowel  obstruction.     The liver demonstrates normal attenuation. There are subcentimeter  hypoattenuating lesions seen on image 19 that are too small to  accurately characterize. The gallbladder  is absent. The spleen is  normal. The pancreas is normal without ductal dilatation. Bilateral  adrenal glands are normal. Both kidneys demonstrate indeterminate  cortical lesions that are subcentimeter in size and difficult to  characterize. There is a small angiolipoma within the left kidney  measuring up to a centimeter. The urinary bladder is moderately  distended with mild irregular circumferential wall thickening. The  uterus is not identified and is likely surgically absent. Colonic  diverticulosis is present.     There is a sub-5 mm nodule within the left lower lobe on image 7. L5-S1  degenerative disc disease with vacuum disc phenomena.     IMPRESSION:  Findings on this CT are most suggestive of a complex cystic  neoplasm possibly arising from the left ovary with peritoneal  carcinomatosis and metastatic lymphadenopathy as described above. These  findings were discussed with Candelario Lindsey MD at the time of  dictation.    Assessment & Plan     1.  New finding on CT of the abdomen suggestive of ovarian malignancy with peritoneal involvement.  Patient had previously undergone hysterectomy but did have 1 remaining ovary.  She has not yet had any tumor marker studies.  She is minimally symptomatic and seems to be inclined to take a very conservative approach if this is a malignancy.  2.  Early stage chronic lymphocytic leukemia diagnosed originally in August 2019.  She has not required any specific treatment and appears to have fairly indolent disease.  Her hemoglobin and platelets remain normal.    Recommendations  1.  We discussed the situation at length with the patient and with her nephew Jatin Abdi who accompanied her to the office today.  They are aware that this likely represents a malignancy but in order to determine this for sure she would need to undergo some type of biopsy procedure.  2.  We will have the patient return to our lab today for additional studies including serum chemistries and   and CEA tumor markers.  3.  Because this presentation is most suggestive of ovarian carcinoma, we have plan to refer the patient to Dr. Childress of GYN oncology to have further discussions about further work-up or treatment.  As noted above the patient seems to want to take a relatively conservative approach and may not be interested in aggressive treatment but she does wish to at least get as much information as possible to make an informed decision.    Currently we have not scheduled routine follow-up in our office but will await recommendations from GYN oncology.    Thanks for allowing us to see this nice patient again.

## 2023-01-17 ENCOUNTER — PATIENT ROUNDING (BHMG ONLY) (OUTPATIENT)
Dept: ONCOLOGY | Facility: CLINIC | Age: 88
End: 2023-01-17
Payer: MEDICARE

## 2023-01-17 NOTE — PROGRESS NOTES
A My-Chart message has been sent to the patient for PATIENT ROUNDING with [facility] [place] [facility].   none

## 2023-01-18 ENCOUNTER — OFFICE VISIT (OUTPATIENT)
Dept: GYNECOLOGIC ONCOLOGY | Facility: CLINIC | Age: 88
End: 2023-01-18
Payer: MEDICARE

## 2023-01-18 VITALS
HEIGHT: 64 IN | OXYGEN SATURATION: 95 % | WEIGHT: 127.2 LBS | DIASTOLIC BLOOD PRESSURE: 72 MMHG | SYSTOLIC BLOOD PRESSURE: 188 MMHG | TEMPERATURE: 98.5 F | BODY MASS INDEX: 21.72 KG/M2 | HEART RATE: 66 BPM | RESPIRATION RATE: 12 BRPM

## 2023-01-18 DIAGNOSIS — G89.3 CANCER ASSOCIATED PAIN: Primary | ICD-10-CM

## 2023-01-18 PROCEDURE — 99213 OFFICE O/P EST LOW 20 MIN: CPT | Performed by: OBSTETRICS & GYNECOLOGY

## 2023-01-18 RX ORDER — HYDROCODONE BITARTRATE AND ACETAMINOPHEN 5; 325 MG/1; MG/1
1 TABLET ORAL EVERY 6 HOURS PRN
Qty: 20 TABLET | Refills: 0 | Status: SHIPPED | OUTPATIENT
Start: 2023-01-18

## 2023-01-18 NOTE — PROGRESS NOTES
"        Age: 89 y.o.  Sex: female  :  1932  MRN: [id number]       REFERRING PHYSICIAN: Bipin Todd MD       Marla Muller presents to [facility] for evaluation and recommendation of large ovarian mass with omental cake and large periaortic lesions concerning for ovarian malignancy. She is aware of the images result and up front adamantly states she does not wish to pursue treatment stating \"I have had a great life and I dont want to be sick.\"       Oncology/Hematology History Overview Note   Marla Muller is a 89 y.o. female referred by Dr. Bipin Todd for a left ovarian mass.     • 1/3/23: CT A/P - There is a complex cystic mass seen within the left hemipelvis with approximate dimensions of 12.7 x 5.6 x 7 cm. This mass shows peripheral eccentric solid component best demonstrated on image 90. This is most concerning for a complex ovarian malignancy. Omental soft tissue implants are identified on image 61 measuring up to 4.5 x 2 cm most consistent with peritoneal carcinomatosis. Small perihepatic ascites is present. There are enlarged lymph nodes/implants seen within the periportal, peripancreatic mesentery and within the retroperitoneum. Index left para-aortic lymph node or mass on image 57 measures 2.3 x 2.3 cm. Enlarged portacaval lymph node measures up to 2.0 x 1.9 cm. Enlarged lymph nodes particularly seen within the right lower quadrant mesentery measuring up to 1.2 cm in short axis dimension. No evidence of bowel obstruction.        23: New Patient          Past Medical History:  Past Medical History:   Diagnosis Date   • Anxiety    • Diabetes mellitus (HCC)     Type 2   • GERD (gastroesophageal reflux disease)    • Hyperlipidemia    • Hypertension    • Hypothyroidism    • Psoriasis        Past Surgical History:  Past Surgical History:   Procedure Laterality Date   • ANTERIOR AND POSTERIOR VAGINAL REPAIR     • APPENDECTOMY     • CHOLECYSTECTOMY     • CYSTOSCOPY  2016    " "Tension free vaginal taping (TVT)   • HYSTERECTOMY     • OTHER SURGICAL HISTORY  2016    Tension-free vaginal taping   • TONSILLECTOMY          Current Meds:  Scheduled Meds:  Continuous Infusions:No current facility-administered medications for this visit.    PRN Meds:.    ALLERGIES:  No Known Allergies      ROS:  CONSTITUTIONAL:  Denies fever or chills.   NEUROLOGIC:  Denies headache, focal weakness or sensory changes.   EYES:  Denies change in visual acuity.  HEENT:  Denies nasal congestion or sore throat.   RESPIRATORY:  Denies cough or shortness of breath.   CARDIOVASCULAR:  Denies chest pain or edema.   GI:  Denies abdominal pain, nausea, vomiting, bloody stools or diarrhea.   :  Denies dysuria, leaking or incontinence.  MUSCULOSKELETAL:  Denies back pain or joint pain.   INTEGUMENT:  Denies rash.   ENDOCRINE:  Denies polyuria or polydipsia.   LYMPHATIC:  Denies swollen glands or lymphedema.   PSYCHIATRIC:  Denies depression or anxiety.      PHYSICAL EXAM:  Vitals:    01/18/23 1503   BP: (!) 188/72  Comment: pt stated that bp normally runs in the 160's   Pulse: 66   Resp: 12   Temp: 98.5 °F (36.9 °C)   TempSrc: Temporal   SpO2: 95%   Weight: 57.7 kg (127 lb 3.2 oz)   Height: 161.3 cm (63.5\")   PainSc: 0-No pain     Body mass index is 22.18 kg/m².  Current Status 1/18/2023   ECOG score 0       GEN: alert and oriented x 3, normal affect, well nourished and hydrated.  CARDIO: regular rate and rhythm.  PULM: Lungs CTA b.l, no RRW   ABD:Soft, nontender, nondistended.   GYN: defer today   EXT: No petechiae, bruising, rash, candida. No CCE.         Result Review :  The pertinent labs, images, and/or pathology as noted in the oncology history were reviewed independently and discussed with the patient.   Bernarda Childress, DO   01/18/2023    [facility]:    [address]    [facility]:  FINDINGS: There is a complex cystic mass seen within the left hemipelvis  with approximate dimensions of 12.7 x 5.6 x 7 cm. This mass " shows  peripheral eccentric solid component best demonstrated on image 90. This  is most concerning for a complex ovarian malignancy. Omental soft tissue  implants are identified on image 61 measuring up to 4.5 x 2 cm most  consistent with peritoneal carcinomatosis. Small perihepatic ascites is  present. There are enlarged lymph nodes/implants seen within the  periportal, peripancreatic mesentery and within the retroperitoneum.  Index left para-aortic lymph node or mass on image 57 measures 2.3 x 2.3  cm. Enlarged portacaval lymph node measures up to 2.0 x 1.9 cm. Enlarged  lymph nodes particularly seen within the right lower quadrant mesentery  measuring up to 1.2 cm in short axis dimension. No evidence of bowel  obstruction.     The liver demonstrates normal attenuation. There are subcentimeter  hypoattenuating lesions seen on image 19 that are too small to  accurately characterize. The gallbladder is absent. The spleen is  normal. The pancreas is normal without ductal dilatation. Bilateral  adrenal glands are normal. Both kidneys demonstrate indeterminate  cortical lesions that are subcentimeter in size and difficult to  characterize. There is a small angiolipoma within the left kidney  measuring up to a centimeter. The urinary bladder is moderately  distended with mild irregular circumferential wall thickening. The  uterus is not identified and is likely surgically absent. Colonic  diverticulosis is present.     There is a sub-5 mm nodule within the left lower lobe on image 7. L5-S1  degenerative disc disease with vacuum disc phenomena.     IMPRESSION:  Findings on this CT are most suggestive of a complex cystic  neoplasm possibly arising from the left ovary with peritoneal  carcinomatosis and metastatic lymphadenopathy as described above. These  findings were discussed with Candelario Lindsey MD at the time of  dictation.     Radiation dose reduction techniques were utilized, including automated  exposure  control and exposure modulation based on body size.     This report was finalized on 1/4/2023 1:40 PM by Dr. Chela Hinson M.D.      ASSESSMENT :  • Suspected ovarian cancer with large adnexal mass , carcinomatosis, enlarged nodes, omental cake       PLAN :  • Had discussion regarding obtaining diagnosis for confirmation and then we discussed treatment options., we reviewed surgery vs chemotherapy and the patient is adamantly not interested in therapy. She states she has lived long great life and is at peace with mortality. I offered palliative care evaluation to help assess needs going forward and she accepts this. Consult placed. Rx for Norco 5/325 #20 for any acute pain.   • Advised to please call if she does not have everything she needs going forward.             MICHAEL PrakashO  1/18/2023    Gynecologic Oncology   [address] [place] [id number]  [phone] office

## 2023-01-30 ENCOUNTER — PATIENT ROUNDING (BHMG ONLY) (OUTPATIENT)
Dept: GYNECOLOGIC ONCOLOGY | Facility: CLINIC | Age: 88
End: 2023-01-30
Payer: MEDICARE

## 2023-05-11 RX ORDER — IRBESARTAN 150 MG/1
150 TABLET ORAL DAILY
Qty: 90 TABLET | Refills: 1 | Status: SHIPPED | OUTPATIENT
Start: 2023-05-11

## 2023-05-11 NOTE — TELEPHONE ENCOUNTER
Caller: [facility] - JESSICA Wood - [facility] AT Portal to Registered [facility] - [phone] PH - [phone] FX    Relationship: Pharmacy    Best call back number: [phone] OPT 2    Requested Prescriptions:   Requested Prescriptions     Pending Prescriptions Disp Refills   • irbesartan (AVAPRO) 150 MG tablet 90 tablet 1     Sig: Take 1 tablet by mouth Daily.        Pharmacy where request should be sent: [facility] - JESSICA WOOD - [facility] AT PORTAL TO [facility] - [phone] PH - [phone] FX     Last office visit with prescribing clinician: 12/7/2022   Last telemedicine visit with prescribing clinician: 4/26/2023   Next office visit with prescribing clinician: Visit date not found     Additional details provided by patient: REFERENCE NUMBER [id number]    PHARMACY EXPLAINS THAT THIS IS THEIR LAST ATTEMPT TO CONTACT AND WILL BE PUTTING THE PRESCRIPTION ON HOLD IF IT IS NOT ANSWERED BY END OF DAY 5/11/2023    Alyssa Dooley Rep   05/11/23 09:44 EDT

## 2023-05-12 ENCOUNTER — TRANSCRIBE ORDERS (OUTPATIENT)
Dept: ADMINISTRATIVE | Facility: HOSPITAL | Age: 88
End: 2023-05-12
Payer: MEDICARE

## 2023-05-12 DIAGNOSIS — M79.89 SWELLING OF LOWER LEG: Primary | ICD-10-CM

## 2023-05-12 DIAGNOSIS — M79.662 PAIN IN LEFT LOWER LEG: ICD-10-CM

## 2023-05-16 ENCOUNTER — HOSPITAL ENCOUNTER (OUTPATIENT)
Dept: CARDIOLOGY | Facility: HOSPITAL | Age: 88
Discharge: HOME | End: 2023-05-16
Admitting: NURSE PRACTITIONER
Payer: MEDICARE

## 2023-05-16 DIAGNOSIS — M79.662 PAIN IN LEFT LOWER LEG: ICD-10-CM

## 2023-05-16 DIAGNOSIS — M79.89 SWELLING OF LOWER LEG: ICD-10-CM

## 2023-05-16 LAB
BH CV LOWER VASCULAR LEFT COMMON FEMORAL AUGMENT: NORMAL
BH CV LOWER VASCULAR LEFT COMMON FEMORAL COMPETENT: NORMAL
BH CV LOWER VASCULAR LEFT COMMON FEMORAL COMPRESS: NORMAL
BH CV LOWER VASCULAR LEFT COMMON FEMORAL PHASIC: NORMAL
BH CV LOWER VASCULAR LEFT COMMON FEMORAL SPONT: NORMAL
BH CV LOWER VASCULAR LEFT DISTAL FEMORAL COMPRESS: NORMAL
BH CV LOWER VASCULAR LEFT GASTRONEMIUS COMPRESS: NORMAL
BH CV LOWER VASCULAR LEFT GREATER SAPH AK COMPRESS: NORMAL
BH CV LOWER VASCULAR LEFT GREATER SAPH BK COMPRESS: NORMAL
BH CV LOWER VASCULAR LEFT LESSER SAPH COMPRESS: NORMAL
BH CV LOWER VASCULAR LEFT MID FEMORAL AUGMENT: NORMAL
BH CV LOWER VASCULAR LEFT MID FEMORAL COMPETENT: NORMAL
BH CV LOWER VASCULAR LEFT MID FEMORAL COMPRESS: NORMAL
BH CV LOWER VASCULAR LEFT MID FEMORAL PHASIC: NORMAL
BH CV LOWER VASCULAR LEFT MID FEMORAL SPONT: NORMAL
BH CV LOWER VASCULAR LEFT PERONEAL COMPRESS: NORMAL
BH CV LOWER VASCULAR LEFT POPLITEAL AUGMENT: NORMAL
BH CV LOWER VASCULAR LEFT POPLITEAL COMPETENT: NORMAL
BH CV LOWER VASCULAR LEFT POPLITEAL COMPRESS: NORMAL
BH CV LOWER VASCULAR LEFT POPLITEAL PHASIC: NORMAL
BH CV LOWER VASCULAR LEFT POPLITEAL SPONT: NORMAL
BH CV LOWER VASCULAR LEFT POSTERIOR TIBIAL COMPRESS: NORMAL
BH CV LOWER VASCULAR LEFT PROFUNDA FEMORAL COMPRESS: NORMAL
BH CV LOWER VASCULAR LEFT PROXIMAL FEMORAL COMPRESS: NORMAL
BH CV LOWER VASCULAR LEFT SAPHENOFEMORAL JUNCTION COMPRESS: NORMAL
BH CV LOWER VASCULAR LEFT SOLEAL COMPRESS: NORMAL
BH CV LOWER VASCULAR RIGHT COMMON FEMORAL AUGMENT: NORMAL
BH CV LOWER VASCULAR RIGHT COMMON FEMORAL COMPETENT: NORMAL
BH CV LOWER VASCULAR RIGHT COMMON FEMORAL COMPRESS: NORMAL
BH CV LOWER VASCULAR RIGHT COMMON FEMORAL PHASIC: NORMAL
BH CV LOWER VASCULAR RIGHT COMMON FEMORAL SPONT: NORMAL
MAXIMAL PREDICTED HEART RATE: 130 BPM
STRESS TARGET HR: 111 BPM

## 2023-05-16 PROCEDURE — 93971 EXTREMITY STUDY: CPT

## 2024-04-02 ENCOUNTER — TELEPHONE (OUTPATIENT)
Dept: ONCOLOGY | Facility: CLINIC | Age: 89
End: 2024-04-02

## 2024-04-02 NOTE — TELEPHONE ENCOUNTER
Please be informed that patient has passed. Patient has been marked  in the system. The date of death is: 24.    Caller: HARSHA    Relationship: [facility]    Best call back number:     [phone]     Did the patient have surgery within 30 days of their passing (Y/N): NA    **ACTUALLY THE PT SAW DR BAILEY 23 NOT DR WHITT